# Patient Record
Sex: FEMALE | Race: WHITE | NOT HISPANIC OR LATINO | Employment: FULL TIME | URBAN - METROPOLITAN AREA
[De-identification: names, ages, dates, MRNs, and addresses within clinical notes are randomized per-mention and may not be internally consistent; named-entity substitution may affect disease eponyms.]

---

## 2017-07-07 ENCOUNTER — OFFICE VISIT (OUTPATIENT)
Dept: INTERNAL MEDICINE | Facility: CLINIC | Age: 62
End: 2017-07-07

## 2017-07-07 VITALS
HEART RATE: 74 BPM | HEIGHT: 63 IN | SYSTOLIC BLOOD PRESSURE: 104 MMHG | BODY MASS INDEX: 22.5 KG/M2 | WEIGHT: 127 LBS | DIASTOLIC BLOOD PRESSURE: 72 MMHG | OXYGEN SATURATION: 100 %

## 2017-07-07 DIAGNOSIS — R20.2 PARESTHESIA OF BOTH LOWER EXTREMITIES: ICD-10-CM

## 2017-07-07 DIAGNOSIS — R20.0 FACIAL NUMBNESS: Primary | ICD-10-CM

## 2017-07-07 LAB
ALBUMIN SERPL-MCNC: 4.2 G/DL (ref 3.2–4.8)
ALBUMIN/GLOB SERPL: 1.7 G/DL (ref 1.5–2.5)
ALP SERPL-CCNC: 88 U/L (ref 25–100)
ALT SERPL W P-5'-P-CCNC: 27 U/L (ref 7–40)
ANION GAP SERPL CALCULATED.3IONS-SCNC: 4 MMOL/L (ref 3–11)
AST SERPL-CCNC: 33 U/L (ref 0–33)
BILIRUB SERPL-MCNC: 0.2 MG/DL (ref 0.3–1.2)
BUN BLD-MCNC: 13 MG/DL (ref 9–23)
BUN/CREAT SERPL: 16.3 (ref 7–25)
CALCIUM SPEC-SCNC: 9.9 MG/DL (ref 8.7–10.4)
CHLORIDE SERPL-SCNC: 103 MMOL/L (ref 99–109)
CO2 SERPL-SCNC: 34 MMOL/L (ref 20–31)
CREAT BLD-MCNC: 0.8 MG/DL (ref 0.6–1.3)
DEPRECATED RDW RBC AUTO: 43.2 FL (ref 37–54)
ERYTHROCYTE [DISTWIDTH] IN BLOOD BY AUTOMATED COUNT: 12.8 % (ref 11.3–14.5)
GFR SERPL CREATININE-BSD FRML MDRD: 72 ML/MIN/1.73
GLOBULIN UR ELPH-MCNC: 2.5 GM/DL
GLUCOSE BLD-MCNC: 82 MG/DL (ref 70–100)
HCT VFR BLD AUTO: 39.9 % (ref 34.5–44)
HGB BLD-MCNC: 12.7 G/DL (ref 11.5–15.5)
MCH RBC QN AUTO: 29.6 PG (ref 27–31)
MCHC RBC AUTO-ENTMCNC: 31.8 G/DL (ref 32–36)
MCV RBC AUTO: 93 FL (ref 80–99)
PLATELET # BLD AUTO: 237 10*3/MM3 (ref 150–450)
PMV BLD AUTO: 10.7 FL (ref 6–12)
POTASSIUM BLD-SCNC: 4.1 MMOL/L (ref 3.5–5.5)
PROT SERPL-MCNC: 6.7 G/DL (ref 5.7–8.2)
RBC # BLD AUTO: 4.29 10*6/MM3 (ref 3.89–5.14)
SODIUM BLD-SCNC: 141 MMOL/L (ref 132–146)
TSH SERPL DL<=0.05 MIU/L-ACNC: 1.44 MIU/ML (ref 0.35–5.35)
VIT B12 BLD-MCNC: 1125 PG/ML (ref 211–911)
WBC NRBC COR # BLD: 7.31 10*3/MM3 (ref 3.5–10.8)

## 2017-07-07 PROCEDURE — 85027 COMPLETE CBC AUTOMATED: CPT | Performed by: PHYSICIAN ASSISTANT

## 2017-07-07 PROCEDURE — 82607 VITAMIN B-12: CPT | Performed by: PHYSICIAN ASSISTANT

## 2017-07-07 PROCEDURE — 80053 COMPREHEN METABOLIC PANEL: CPT | Performed by: PHYSICIAN ASSISTANT

## 2017-07-07 PROCEDURE — 84443 ASSAY THYROID STIM HORMONE: CPT | Performed by: PHYSICIAN ASSISTANT

## 2017-07-07 PROCEDURE — 99203 OFFICE O/P NEW LOW 30 MIN: CPT | Performed by: PHYSICIAN ASSISTANT

## 2017-07-07 RX ORDER — AMITRIPTYLINE HYDROCHLORIDE 25 MG/1
1 TABLET, FILM COATED ORAL NIGHTLY
Refills: 1 | COMMUNITY
Start: 2017-06-13

## 2017-07-07 NOTE — PROGRESS NOTES
Chief Complaint   Patient presents with   • Establish Care     new pt   • Numbness     bilateral, intermittent for about 3 weeks       Subjective   Maite Lopes is a 62 y.o. female.       History of Present Illness     Pt has previously seen Dr Bladimir Florence and he is closing his practice. Sees Dr Ortiz for rheumatology and Dr Hilliard for gyn care. Dr Nick has done colonoscopy in the past.    Has osteoarthritis with degenerative changes in her neck. Hears some grinding in her neck when she turns her head. No pain with it. Has some known bone spurs that cause pain with overuse and certain movements.    She notes that she has had intermittent numbness on both sides of her face for the past month. Has some previous intermittent numbness in her soft palate and upper gums that comes with worsening sinus issues. The first time she noticed it, was driving and had sensation of tingling that went up her bilateral cheeks- not pins and needles. Has not tried touching her face when happening.     In the last couple of months she has noticed a feeling like something is crawling on her bilateral legs and feet. Happens intermittently and is transient. No noticeable loss of strength in her legs, walks daily to get to work.    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 She does grind her teeth and clenches her jaw.      Current Outpatient Prescriptions:   •  amitriptyline (ELAVIL) 25 MG tablet, Take 1 tablet by mouth Daily., Disp: , Rfl: 1     [facility]  The following portions of the patient's history were reviewed and updated as appropriate: allergies, current medications, past family history, past medical history, past social history, past surgical history and problem list.    Review of Systems   Constitutional: Negative for appetite change, chills, fever and unexpected weight change.   HENT: Negative for ear discharge, sinus pressure and tinnitus.    Eyes: Negative for photophobia and  "pain.   Respiratory: Negative for chest tightness, shortness of breath and wheezing.    Cardiovascular: Negative for chest pain and palpitations.   Gastrointestinal: Negative for abdominal pain and blood in stool.   Endocrine: Negative for cold intolerance, heat intolerance, polydipsia and polyphagia.   Genitourinary: Negative.    Musculoskeletal: Negative for joint swelling.   Skin: Negative for color change and wound.   Allergic/Immunologic: Negative.    Neurological: Positive for numbness. Negative for dizziness, tremors, syncope, facial asymmetry, speech difficulty, weakness, light-headedness and headaches.   Hematological: Negative for adenopathy.   Psychiatric/Behavioral: Negative for confusion, hallucinations, sleep disturbance and suicidal ideas. The patient is nervous/anxious.        Objective   /72  Pulse 74  Ht 63\" (160 cm)  Wt 127 lb (57.6 kg)  SpO2 100%  BMI 22.5 kg/m2    Physical Exam   Constitutional: She appears well-developed and well-nourished.   HENT:   Head: Normocephalic.   Right Ear: Hearing, tympanic membrane, external ear and ear canal normal.   Left Ear: Hearing, tympanic membrane, external ear and ear canal normal.   Nose: Nose normal.   Mouth/Throat: Oropharynx is clear and moist.   Eyes: Conjunctivae are normal. Pupils are equal, round, and reactive to light.   Neck: Normal range of motion.   Cardiovascular: Normal rate, regular rhythm and normal heart sounds.    Pulmonary/Chest: Effort normal and breath sounds normal. She has no decreased breath sounds. She has no wheezes. She has no rhonchi. She has no rales.   Musculoskeletal: Normal range of motion.   Neurological: She is alert. She has normal strength. She displays normal reflexes. No cranial nerve deficit or sensory deficit.   Reflex Scores:       Brachioradialis reflexes are 2+ on the right side and 2+ on the left side.       Patellar reflexes are 2+ on the right side and 2+ on the left side.       Achilles reflexes are " 2+ on the right side and 2+ on the left side.  Skin: Skin is warm and dry.   Psychiatric: She has a normal mood and affect. Her behavior is normal.   Nursing note and vitals reviewed.      Results for orders placed or performed in visit on 07/07/17   CBC (No Diff)   Result Value Ref Range    WBC 7.31 3.50 - 10.80 10*3/mm3    RBC 4.29 3.89 - 5.14 10*6/mm3    Hemoglobin 12.7 11.5 - 15.5 g/dL    Hematocrit 39.9 34.5 - 44.0 %    MCV 93.0 80.0 - 99.0 fL    MCH 29.6 27.0 - 31.0 pg    MCHC 31.8 (L) 32.0 - 36.0 g/dL    RDW 12.8 11.3 - 14.5 %    RDW-SD 43.2 37.0 - 54.0 fl    MPV 10.7 6.0 - 12.0 fL    Platelets 237 150 - 450 10*3/mm3   TSH   Result Value Ref Range    TSH 1.436 0.350 - 5.350 mIU/mL   Vitamin B12   Result Value Ref Range    Vitamin B-12 1125 (H) 211 - 911 pg/mL   Comprehensive Metabolic Panel   Result Value Ref Range    Glucose 82 70 - 100 mg/dL    BUN 13 9 - 23 mg/dL    Creatinine 0.80 0.60 - 1.30 mg/dL    Sodium 141 132 - 146 mmol/L    Potassium 4.1 3.5 - 5.5 mmol/L    Chloride 103 99 - 109 mmol/L    CO2 34.0 (H) 20.0 - 31.0 mmol/L    Calcium 9.9 8.7 - 10.4 mg/dL    Total Protein 6.7 5.7 - 8.2 g/dL    Albumin 4.20 3.20 - 4.80 g/dL    ALT (SGPT) 27 7 - 40 U/L    AST (SGOT) 33 0 - 33 U/L    Alkaline Phosphatase 88 25 - 100 U/L    Total Bilirubin 0.2 (L) 0.3 - 1.2 mg/dL    eGFR Non African Amer 72 >60 mL/min/1.73    Globulin 2.5 gm/dL    A/G Ratio 1.7 1.5 - 2.5 g/dL    BUN/Creatinine Ratio 16.3 7.0 - 25.0    Anion Gap 4.0 3.0 - 11.0 mmol/L        ASSESSMENT/PLAN    Problem List Items Addressed This Visit     None      Visit Diagnoses     Facial numbness    -  Primary    Relevant Orders    MRI Brain With & Without Contrast    CBC (No Diff) (Completed)    TSH (Completed)    Vitamin B12 (Completed)    Comprehensive Metabolic Panel (Completed)    Paresthesia of both lower extremities        Relevant Orders    MRI Brain With & Without Contrast    CBC (No Diff) (Completed)    TSH (Completed)    Vitamin B12 (Completed)     Comprehensive Metabolic Panel (Completed)               Return for Annual physical.

## 2017-07-17 ENCOUNTER — APPOINTMENT (OUTPATIENT)
Dept: MRI IMAGING | Facility: HOSPITAL | Age: 62
End: 2017-07-17

## 2019-03-25 ENCOUNTER — APPOINTMENT (OUTPATIENT)
Dept: PREADMISSION TESTING | Facility: HOSPITAL | Age: 64
End: 2019-03-25

## 2019-03-25 VITALS — HEIGHT: 63 IN | BODY MASS INDEX: 20.39 KG/M2 | WEIGHT: 115.08 LBS

## 2019-03-25 LAB
ANION GAP SERPL CALCULATED.3IONS-SCNC: 9 MMOL/L (ref 3–11)
BUN BLD-MCNC: 16 MG/DL (ref 9–23)
BUN/CREAT SERPL: 19.8 (ref 7–25)
CALCIUM SPEC-SCNC: 9.6 MG/DL (ref 8.7–10.4)
CHLORIDE SERPL-SCNC: 102 MMOL/L (ref 99–109)
CO2 SERPL-SCNC: 30 MMOL/L (ref 20–31)
CREAT BLD-MCNC: 0.81 MG/DL (ref 0.6–1.3)
DEPRECATED RDW RBC AUTO: 44.3 FL (ref 37–54)
ERYTHROCYTE [DISTWIDTH] IN BLOOD BY AUTOMATED COUNT: 12.9 % (ref 11.3–14.5)
GFR SERPL CREATININE-BSD FRML MDRD: 71 ML/MIN/1.73
GLUCOSE BLD-MCNC: 93 MG/DL (ref 70–100)
HCT VFR BLD AUTO: 39.4 % (ref 34.5–44)
HGB BLD-MCNC: 12.6 G/DL (ref 11.5–15.5)
MCH RBC QN AUTO: 30 PG (ref 27–31)
MCHC RBC AUTO-ENTMCNC: 32 G/DL (ref 32–36)
MCV RBC AUTO: 93.8 FL (ref 80–99)
PLATELET # BLD AUTO: 232 10*3/MM3 (ref 150–450)
PMV BLD AUTO: 10.7 FL (ref 6–12)
POTASSIUM BLD-SCNC: 4 MMOL/L (ref 3.5–5.5)
RBC # BLD AUTO: 4.2 10*6/MM3 (ref 3.89–5.14)
SODIUM BLD-SCNC: 141 MMOL/L (ref 132–146)
WBC NRBC COR # BLD: 6.68 10*3/MM3 (ref 3.5–10.8)

## 2019-03-25 PROCEDURE — 36415 COLL VENOUS BLD VENIPUNCTURE: CPT

## 2019-03-25 PROCEDURE — 80048 BASIC METABOLIC PNL TOTAL CA: CPT | Performed by: OBSTETRICS & GYNECOLOGY

## 2019-03-25 PROCEDURE — 85027 COMPLETE CBC AUTOMATED: CPT | Performed by: OBSTETRICS & GYNECOLOGY

## 2019-04-02 ENCOUNTER — ANESTHESIA EVENT (OUTPATIENT)
Dept: PERIOP | Facility: HOSPITAL | Age: 64
End: 2019-04-02

## 2019-04-02 RX ORDER — SODIUM CHLORIDE 0.9 % (FLUSH) 0.9 %
3 SYRINGE (ML) INJECTION EVERY 12 HOURS SCHEDULED
Status: CANCELLED | OUTPATIENT
Start: 2019-04-02

## 2019-04-02 RX ORDER — FAMOTIDINE 10 MG/ML
20 INJECTION, SOLUTION INTRAVENOUS ONCE
Status: CANCELLED | OUTPATIENT
Start: 2019-04-02 | End: 2019-04-02

## 2019-04-02 RX ORDER — SODIUM CHLORIDE 0.9 % (FLUSH) 0.9 %
3-10 SYRINGE (ML) INJECTION AS NEEDED
Status: CANCELLED | OUTPATIENT
Start: 2019-04-02

## 2019-04-03 ENCOUNTER — ANESTHESIA (OUTPATIENT)
Dept: PERIOP | Facility: HOSPITAL | Age: 64
End: 2019-04-03

## 2019-04-03 ENCOUNTER — HOSPITAL ENCOUNTER (OUTPATIENT)
Facility: HOSPITAL | Age: 64
Discharge: HOME | End: 2019-04-04
Attending: OBSTETRICS & GYNECOLOGY | Admitting: OBSTETRICS & GYNECOLOGY

## 2019-04-03 DIAGNOSIS — N81.4 UTERINE PROLAPSE: ICD-10-CM

## 2019-04-03 PROCEDURE — 25010000002 GENTAMICIN PER 80 MG: Performed by: OBSTETRICS & GYNECOLOGY

## 2019-04-03 PROCEDURE — 25010000002 DEXAMETHASONE PER 1 MG: Performed by: NURSE ANESTHETIST, CERTIFIED REGISTERED

## 2019-04-03 PROCEDURE — 25010000002 ONDANSETRON PER 1 MG: Performed by: NURSE ANESTHETIST, CERTIFIED REGISTERED

## 2019-04-03 PROCEDURE — 25010000002 FENTANYL CITRATE (PF) 100 MCG/2ML SOLUTION: Performed by: NURSE ANESTHETIST, CERTIFIED REGISTERED

## 2019-04-03 PROCEDURE — 25010000002 PROPOFOL 1000 MG/ML EMULSION: Performed by: NURSE ANESTHETIST, CERTIFIED REGISTERED

## 2019-04-03 PROCEDURE — 25010000002 PHENYLEPHRINE PER 1 ML: Performed by: NURSE ANESTHETIST, CERTIFIED REGISTERED

## 2019-04-03 PROCEDURE — 25010000002 PROPOFOL 10 MG/ML EMULSION: Performed by: NURSE ANESTHETIST, CERTIFIED REGISTERED

## 2019-04-03 PROCEDURE — 25010000002 KETOROLAC TROMETHAMINE PER 15 MG: Performed by: NURSE ANESTHETIST, CERTIFIED REGISTERED

## 2019-04-03 PROCEDURE — 25010000002 NEOSTIGMINE 10 MG/10ML SOLUTION: Performed by: NURSE ANESTHETIST, CERTIFIED REGISTERED

## 2019-04-03 PROCEDURE — 88305 TISSUE EXAM BY PATHOLOGIST: CPT | Performed by: OBSTETRICS & GYNECOLOGY

## 2019-04-03 RX ORDER — MORPHINE SULFATE 2 MG/ML
1 INJECTION, SOLUTION INTRAMUSCULAR; INTRAVENOUS EVERY 4 HOURS PRN
Status: DISCONTINUED | OUTPATIENT
Start: 2019-04-03 | End: 2019-04-04 | Stop reason: HOSPADM

## 2019-04-03 RX ORDER — SODIUM CHLORIDE 9 MG/ML
100 INJECTION, SOLUTION INTRAVENOUS CONTINUOUS
Status: DISCONTINUED | OUTPATIENT
Start: 2019-04-03 | End: 2019-04-04 | Stop reason: HOSPADM

## 2019-04-03 RX ORDER — GABAPENTIN 100 MG/1
100 CAPSULE ORAL 3 TIMES DAILY
Status: ON HOLD | COMMUNITY
End: 2019-04-03

## 2019-04-03 RX ORDER — AMITRIPTYLINE HYDROCHLORIDE 25 MG/1
25 TABLET, FILM COATED ORAL NIGHTLY
Status: DISCONTINUED | OUTPATIENT
Start: 2019-04-03 | End: 2019-04-04 | Stop reason: HOSPADM

## 2019-04-03 RX ORDER — FAMOTIDINE 20 MG/1
20 TABLET, FILM COATED ORAL ONCE
Status: COMPLETED | OUTPATIENT
Start: 2019-04-03 | End: 2019-04-03

## 2019-04-03 RX ORDER — SIMETHICONE 80 MG
80 TABLET,CHEWABLE ORAL 4 TIMES DAILY PRN
Status: DISCONTINUED | OUTPATIENT
Start: 2019-04-03 | End: 2019-04-04 | Stop reason: HOSPADM

## 2019-04-03 RX ORDER — NEOSTIGMINE METHYLSULFATE 1 MG/ML
INJECTION, SOLUTION INTRAVENOUS AS NEEDED
Status: DISCONTINUED | OUTPATIENT
Start: 2019-04-03 | End: 2019-04-03 | Stop reason: SURG

## 2019-04-03 RX ORDER — FENTANYL CITRATE 50 UG/ML
50 INJECTION, SOLUTION INTRAMUSCULAR; INTRAVENOUS
Status: DISCONTINUED | OUTPATIENT
Start: 2019-04-03 | End: 2019-04-03 | Stop reason: HOSPADM

## 2019-04-03 RX ORDER — ONDANSETRON 2 MG/ML
4 INJECTION INTRAMUSCULAR; INTRAVENOUS EVERY 6 HOURS PRN
Status: DISCONTINUED | OUTPATIENT
Start: 2019-04-03 | End: 2019-04-04 | Stop reason: HOSPADM

## 2019-04-03 RX ORDER — LIDOCAINE HYDROCHLORIDE 10 MG/ML
0.5 INJECTION, SOLUTION EPIDURAL; INFILTRATION; INTRACAUDAL; PERINEURAL ONCE AS NEEDED
Status: COMPLETED | OUTPATIENT
Start: 2019-04-03 | End: 2019-04-03

## 2019-04-03 RX ORDER — HEPARIN SODIUM 5000 [USP'U]/ML
5000 INJECTION, SOLUTION INTRAVENOUS; SUBCUTANEOUS EVERY 12 HOURS SCHEDULED
Status: DISCONTINUED | OUTPATIENT
Start: 2019-04-04 | End: 2019-04-04 | Stop reason: HOSPADM

## 2019-04-03 RX ORDER — ONDANSETRON 2 MG/ML
INJECTION INTRAMUSCULAR; INTRAVENOUS AS NEEDED
Status: DISCONTINUED | OUTPATIENT
Start: 2019-04-03 | End: 2019-04-03 | Stop reason: SURG

## 2019-04-03 RX ORDER — ONDANSETRON 2 MG/ML
4 INJECTION INTRAMUSCULAR; INTRAVENOUS ONCE AS NEEDED
Status: DISCONTINUED | OUTPATIENT
Start: 2019-04-03 | End: 2019-04-03 | Stop reason: HOSPADM

## 2019-04-03 RX ORDER — CLINDAMYCIN PHOSPHATE 900 MG/50ML
900 INJECTION INTRAVENOUS EVERY 8 HOURS
Status: COMPLETED | OUTPATIENT
Start: 2019-04-03 | End: 2019-04-04

## 2019-04-03 RX ORDER — KETOROLAC TROMETHAMINE 30 MG/ML
INJECTION, SOLUTION INTRAMUSCULAR; INTRAVENOUS AS NEEDED
Status: DISCONTINUED | OUTPATIENT
Start: 2019-04-03 | End: 2019-04-03 | Stop reason: SURG

## 2019-04-03 RX ORDER — FENTANYL CITRATE 50 UG/ML
INJECTION, SOLUTION INTRAMUSCULAR; INTRAVENOUS AS NEEDED
Status: DISCONTINUED | OUTPATIENT
Start: 2019-04-03 | End: 2019-04-03 | Stop reason: SURG

## 2019-04-03 RX ORDER — HYDROMORPHONE HYDROCHLORIDE 1 MG/ML
0.5 INJECTION, SOLUTION INTRAMUSCULAR; INTRAVENOUS; SUBCUTANEOUS
Status: DISCONTINUED | OUTPATIENT
Start: 2019-04-03 | End: 2019-04-03 | Stop reason: HOSPADM

## 2019-04-03 RX ORDER — ONDANSETRON 4 MG/1
4 TABLET, FILM COATED ORAL EVERY 6 HOURS PRN
Status: DISCONTINUED | OUTPATIENT
Start: 2019-04-03 | End: 2019-04-04 | Stop reason: HOSPADM

## 2019-04-03 RX ORDER — PROPOFOL 10 MG/ML
VIAL (ML) INTRAVENOUS AS NEEDED
Status: DISCONTINUED | OUTPATIENT
Start: 2019-04-03 | End: 2019-04-03 | Stop reason: SURG

## 2019-04-03 RX ORDER — LIDOCAINE HYDROCHLORIDE 10 MG/ML
INJECTION, SOLUTION EPIDURAL; INFILTRATION; INTRACAUDAL; PERINEURAL AS NEEDED
Status: DISCONTINUED | OUTPATIENT
Start: 2019-04-03 | End: 2019-04-03 | Stop reason: SURG

## 2019-04-03 RX ORDER — DEXAMETHASONE SODIUM PHOSPHATE 4 MG/ML
INJECTION, SOLUTION INTRA-ARTICULAR; INTRALESIONAL; INTRAMUSCULAR; INTRAVENOUS; SOFT TISSUE AS NEEDED
Status: DISCONTINUED | OUTPATIENT
Start: 2019-04-03 | End: 2019-04-03 | Stop reason: SURG

## 2019-04-03 RX ORDER — SODIUM CHLORIDE 9 MG/ML
INJECTION, SOLUTION INTRAVENOUS AS NEEDED
Status: DISCONTINUED | OUTPATIENT
Start: 2019-04-03 | End: 2019-04-03 | Stop reason: HOSPADM

## 2019-04-03 RX ORDER — IBUPROFEN 400 MG/1
400 TABLET ORAL EVERY 6 HOURS PRN
Status: DISCONTINUED | OUTPATIENT
Start: 2019-04-03 | End: 2019-04-04 | Stop reason: HOSPADM

## 2019-04-03 RX ORDER — SODIUM CHLORIDE, SODIUM LACTATE, POTASSIUM CHLORIDE, CALCIUM CHLORIDE 600; 310; 30; 20 MG/100ML; MG/100ML; MG/100ML; MG/100ML
9 INJECTION, SOLUTION INTRAVENOUS CONTINUOUS
Status: DISCONTINUED | OUTPATIENT
Start: 2019-04-03 | End: 2019-04-04 | Stop reason: HOSPADM

## 2019-04-03 RX ORDER — MAGNESIUM HYDROXIDE 1200 MG/15ML
LIQUID ORAL AS NEEDED
Status: DISCONTINUED | OUTPATIENT
Start: 2019-04-03 | End: 2019-04-03 | Stop reason: HOSPADM

## 2019-04-03 RX ORDER — HYDROCODONE BITARTRATE AND ACETAMINOPHEN 7.5; 325 MG/1; MG/1
1 TABLET ORAL EVERY 4 HOURS PRN
Status: DISCONTINUED | OUTPATIENT
Start: 2019-04-03 | End: 2019-04-04 | Stop reason: HOSPADM

## 2019-04-03 RX ORDER — GLYCOPYRROLATE 0.2 MG/ML
INJECTION INTRAMUSCULAR; INTRAVENOUS AS NEEDED
Status: DISCONTINUED | OUTPATIENT
Start: 2019-04-03 | End: 2019-04-03 | Stop reason: SURG

## 2019-04-03 RX ORDER — BACLOFEN 10 MG/1
10 TABLET ORAL 3 TIMES DAILY
COMMUNITY

## 2019-04-03 RX ORDER — ATRACURIUM BESYLATE 10 MG/ML
INJECTION, SOLUTION INTRAVENOUS AS NEEDED
Status: DISCONTINUED | OUTPATIENT
Start: 2019-04-03 | End: 2019-04-03 | Stop reason: SURG

## 2019-04-03 RX ORDER — HEPARIN SODIUM 5000 [USP'U]/ML
5000 INJECTION, SOLUTION INTRAVENOUS; SUBCUTANEOUS ONCE
Status: DISCONTINUED | OUTPATIENT
Start: 2019-04-03 | End: 2019-04-03 | Stop reason: HOSPADM

## 2019-04-03 RX ORDER — NALOXONE HCL 0.4 MG/ML
0.4 VIAL (ML) INJECTION
Status: DISCONTINUED | OUTPATIENT
Start: 2019-04-03 | End: 2019-04-04 | Stop reason: HOSPADM

## 2019-04-03 RX ORDER — CLINDAMYCIN PHOSPHATE 900 MG/50ML
900 INJECTION INTRAVENOUS ONCE
Status: COMPLETED | OUTPATIENT
Start: 2019-04-03 | End: 2019-04-03

## 2019-04-03 RX ORDER — BUPIVACAINE HYDROCHLORIDE 5 MG/ML
INJECTION, SOLUTION PERINEURAL AS NEEDED
Status: DISCONTINUED | OUTPATIENT
Start: 2019-04-03 | End: 2019-04-03 | Stop reason: HOSPADM

## 2019-04-03 RX ADMIN — CLINDAMYCIN PHOSPHATE 900 MG: 900 INJECTION, SOLUTION INTRAVENOUS at 18:13

## 2019-04-03 RX ADMIN — LIDOCAINE HYDROCHLORIDE 50 MG: 10 INJECTION, SOLUTION EPIDURAL; INFILTRATION; INTRACAUDAL; PERINEURAL at 09:54

## 2019-04-03 RX ADMIN — PHENYLEPHRINE HYDROCHLORIDE 80 MCG: 10 INJECTION INTRAVENOUS at 10:18

## 2019-04-03 RX ADMIN — DEXAMETHASONE SODIUM PHOSPHATE 8 MG: 4 INJECTION, SOLUTION INTRAMUSCULAR; INTRAVENOUS at 10:04

## 2019-04-03 RX ADMIN — FAMOTIDINE 20 MG: 20 TABLET ORAL at 08:15

## 2019-04-03 RX ADMIN — SODIUM CHLORIDE 100 ML/HR: 9 INJECTION, SOLUTION INTRAVENOUS at 15:16

## 2019-04-03 RX ADMIN — AZTREONAM 2 G: 2 INJECTION, POWDER, LYOPHILIZED, FOR SOLUTION INTRAMUSCULAR; INTRAVENOUS at 23:54

## 2019-04-03 RX ADMIN — PHENYLEPHRINE HYDROCHLORIDE 80 MCG: 10 INJECTION INTRAVENOUS at 10:09

## 2019-04-03 RX ADMIN — ONDANSETRON 4 MG: 2 INJECTION INTRAMUSCULAR; INTRAVENOUS at 11:30

## 2019-04-03 RX ADMIN — ATRACURIUM BESYLATE 40 MG: 10 INJECTION, SOLUTION INTRAVENOUS at 09:55

## 2019-04-03 RX ADMIN — AMITRIPTYLINE HYDROCHLORIDE 25 MG: 25 TABLET, FILM COATED ORAL at 21:41

## 2019-04-03 RX ADMIN — PHENYLEPHRINE HYDROCHLORIDE 80 MCG: 10 INJECTION INTRAVENOUS at 10:27

## 2019-04-03 RX ADMIN — PROPOFOL 25 MCG/KG/MIN: 10 INJECTION, EMULSION INTRAVENOUS at 10:05

## 2019-04-03 RX ADMIN — KETOROLAC TROMETHAMINE 30 MG: 30 INJECTION, SOLUTION INTRAMUSCULAR at 11:30

## 2019-04-03 RX ADMIN — PHENYLEPHRINE HYDROCHLORIDE 80 MCG: 10 INJECTION INTRAVENOUS at 10:12

## 2019-04-03 RX ADMIN — EPHEDRINE SULFATE 10 MG: 50 INJECTION INTRAMUSCULAR; INTRAVENOUS; SUBCUTANEOUS at 10:25

## 2019-04-03 RX ADMIN — AZTREONAM 2 G: 2 INJECTION, POWDER, LYOPHILIZED, FOR SOLUTION INTRAMUSCULAR; INTRAVENOUS at 17:26

## 2019-04-03 RX ADMIN — FENTANYL CITRATE 50 MCG: 50 INJECTION, SOLUTION INTRAMUSCULAR; INTRAVENOUS at 09:54

## 2019-04-03 RX ADMIN — GLYCOPYRROLATE 0.2 MG: 0.2 INJECTION, SOLUTION INTRAMUSCULAR; INTRAVENOUS at 11:45

## 2019-04-03 RX ADMIN — FENTANYL CITRATE 50 MCG: 50 INJECTION, SOLUTION INTRAMUSCULAR; INTRAVENOUS at 11:46

## 2019-04-03 RX ADMIN — EPHEDRINE SULFATE 5 MG: 50 INJECTION INTRAMUSCULAR; INTRAVENOUS; SUBCUTANEOUS at 10:54

## 2019-04-03 RX ADMIN — SODIUM CHLORIDE, POTASSIUM CHLORIDE, SODIUM LACTATE AND CALCIUM CHLORIDE: 600; 310; 30; 20 INJECTION, SOLUTION INTRAVENOUS at 11:31

## 2019-04-03 RX ADMIN — EPHEDRINE SULFATE 10 MG: 50 INJECTION INTRAMUSCULAR; INTRAVENOUS; SUBCUTANEOUS at 10:27

## 2019-04-03 RX ADMIN — PROPOFOL 150 MG: 10 INJECTION, EMULSION INTRAVENOUS at 09:54

## 2019-04-03 RX ADMIN — GENTAMICIN SULFATE 260 MG: 40 INJECTION, SOLUTION INTRAMUSCULAR; INTRAVENOUS at 10:03

## 2019-04-03 RX ADMIN — LIDOCAINE HYDROCHLORIDE 0.2 ML: 10 INJECTION, SOLUTION EPIDURAL; INFILTRATION; INTRACAUDAL; PERINEURAL at 08:00

## 2019-04-03 RX ADMIN — NEOSTIGMINE METHYLSULFATE 2 MG: 1 INJECTION, SOLUTION INTRAVENOUS at 11:45

## 2019-04-03 RX ADMIN — EPHEDRINE SULFATE 10 MG: 50 INJECTION INTRAMUSCULAR; INTRAVENOUS; SUBCUTANEOUS at 11:00

## 2019-04-03 RX ADMIN — CLINDAMYCIN PHOSPHATE 900 MG: 18 INJECTION, SOLUTION INTRAVENOUS at 09:54

## 2019-04-03 RX ADMIN — SODIUM CHLORIDE, POTASSIUM CHLORIDE, SODIUM LACTATE AND CALCIUM CHLORIDE 9 ML/HR: 600; 310; 30; 20 INJECTION, SOLUTION INTRAVENOUS at 08:00

## 2019-04-03 NOTE — ANESTHESIA PREPROCEDURE EVALUATION
Anesthesia Evaluation     Patient summary reviewed and Nursing notes reviewed                Airway   Mallampati: II  Dental      Pulmonary - negative pulmonary ROS   Cardiovascular - negative cardio ROS        Neuro/Psych- negative ROS  GI/Hepatic/Renal/Endo - negative ROS     Musculoskeletal (-) negative ROS    Abdominal    Substance History - negative use     OB/GYN negative ob/gyn ROS         Other                        Anesthesia Plan    ASA 3     general     intravenous induction   Anesthetic plan, all risks, benefits, and alternatives have been provided, discussed and informed consent has been obtained with: patient.    Plan discussed with CRNA.

## 2019-04-03 NOTE — ANESTHESIA POSTPROCEDURE EVALUATION
Patient: Maite Lopes    Procedure Summary     Date:  04/03/19 Room / Location:   [facility] / [facility]    Anesthesia Start:  0951 Anesthesia Stop:  1210    Procedure:  LAPAROSCOPIC ASSISTED VAGINAL HYSTERECTOMY WITH BILATERAL SALPINGO-OOPHORECTOMY, VAGINAL VAULT SUSPENSION, ANTERIOR VAGINAL REPAIR (N/A Abdomen) Diagnosis:      Surgeon:  Nuun Hilliard MD Provider:  Scott Hayes MD    Anesthesia Type:  general ASA Status:  3          Anesthesia Type: general  Last vitals  BP   115/65 (04/03/19 1210)   Temp   97.5 °F (36.4 °C) (04/03/19 1210)   Pulse   64 (04/03/19 1210)   Resp   12 (04/03/19 1210)     SpO2   97 % (04/03/19 1210)     Post Anesthesia Care and Evaluation    Patient location during evaluation: PACU  Patient participation: complete - patient cannot participate  Level of consciousness: responsive to physical stimuli  Pain score: 0  Pain management: adequate  Airway patency: patent  Anesthetic complications: No anesthetic complications  PONV Status: none  Cardiovascular status: acceptable  Respiratory status: acceptable  Hydration status: acceptable

## 2019-04-03 NOTE — ANESTHESIA PROCEDURE NOTES
Airway  Urgency: elective    Airway not difficult    General Information and Staff    Patient location during procedure: OR  CRNA: Elzbieta Ching CRNA    Indications and Patient Condition  Indications for airway management: airway protection    Preoxygenated: yes  MILS not maintained throughout  Mask difficulty assessment: 1 - vent by mask    Final Airway Details  Final airway type: endotracheal airway      Successful airway: ETT  Cuffed: yes   Successful intubation technique: video laryngoscopy  Endotracheal tube insertion site: oral  Blade size: 3  ETT size (mm): 7.0  Cormack-Lehane Classification: grade I - full view of glottis  Placement verified by: chest auscultation and capnometry   Measured from: lips  ETT to lips (cm): 20  Number of attempts at approach: 1    Additional Comments  Negative epigastric sounds, Breath sound equal bilaterally with symmetric chest rise and fall    Limited neck ROM. Pt states she wore neck brace for several weeks after last surgery d/t pain. Glidescope 3 used without neck extension, neutral neck position maintained throughout induction and intubation.

## 2019-04-04 VITALS
BODY MASS INDEX: 20.39 KG/M2 | RESPIRATION RATE: 18 BRPM | TEMPERATURE: 97.9 F | OXYGEN SATURATION: 97 % | SYSTOLIC BLOOD PRESSURE: 136 MMHG | HEART RATE: 80 BPM | WEIGHT: 115.08 LBS | DIASTOLIC BLOOD PRESSURE: 69 MMHG | HEIGHT: 63 IN

## 2019-04-04 PROBLEM — N81.4 UTERINE PROLAPSE: Status: RESOLVED | Noted: 2019-04-03 | Resolved: 2019-04-04

## 2019-04-04 LAB
ANION GAP SERPL CALCULATED.3IONS-SCNC: 6 MMOL/L (ref 3–11)
BUN BLD-MCNC: 13 MG/DL (ref 9–23)
BUN/CREAT SERPL: 18.6 (ref 7–25)
CALCIUM SPEC-SCNC: 8.7 MG/DL (ref 8.7–10.4)
CHLORIDE SERPL-SCNC: 110 MMOL/L (ref 99–109)
CO2 SERPL-SCNC: 25 MMOL/L (ref 20–31)
CREAT BLD-MCNC: 0.7 MG/DL (ref 0.6–1.3)
DEPRECATED RDW RBC AUTO: 44.4 FL (ref 37–54)
ERYTHROCYTE [DISTWIDTH] IN BLOOD BY AUTOMATED COUNT: 13.2 % (ref 11.3–14.5)
GFR SERPL CREATININE-BSD FRML MDRD: 84 ML/MIN/1.73
GLUCOSE BLD-MCNC: 111 MG/DL (ref 70–100)
HCT VFR BLD AUTO: 34.5 % (ref 34.5–44)
HGB BLD-MCNC: 11.6 G/DL (ref 11.5–15.5)
MCH RBC QN AUTO: 30.9 PG (ref 27–31)
MCHC RBC AUTO-ENTMCNC: 33.6 G/DL (ref 32–36)
MCV RBC AUTO: 92 FL (ref 80–99)
PLATELET # BLD AUTO: 225 10*3/MM3 (ref 150–450)
PMV BLD AUTO: 10.7 FL (ref 6–12)
POTASSIUM BLD-SCNC: 4.6 MMOL/L (ref 3.5–5.5)
RBC # BLD AUTO: 3.75 10*6/MM3 (ref 3.89–5.14)
SODIUM BLD-SCNC: 141 MMOL/L (ref 132–146)
WBC NRBC COR # BLD: 11.48 10*3/MM3 (ref 3.5–10.8)

## 2019-04-04 PROCEDURE — 85027 COMPLETE CBC AUTOMATED: CPT | Performed by: OBSTETRICS & GYNECOLOGY

## 2019-04-04 PROCEDURE — 25010000002 HEPARIN (PORCINE) PER 1000 UNITS: Performed by: OBSTETRICS & GYNECOLOGY

## 2019-04-04 PROCEDURE — 80048 BASIC METABOLIC PNL TOTAL CA: CPT | Performed by: OBSTETRICS & GYNECOLOGY

## 2019-04-04 RX ORDER — HYDROCODONE BITARTRATE AND ACETAMINOPHEN 7.5; 325 MG/1; MG/1
1 TABLET ORAL EVERY 4 HOURS PRN
Qty: 10 TABLET | Refills: 0 | Status: SHIPPED | OUTPATIENT
Start: 2019-04-04 | End: 2019-04-13

## 2019-04-04 RX ADMIN — SODIUM CHLORIDE 100 ML/HR: 9 INJECTION, SOLUTION INTRAVENOUS at 01:37

## 2019-04-04 RX ADMIN — HEPARIN SODIUM 5000 UNITS: 5000 INJECTION INTRAVENOUS; SUBCUTANEOUS at 09:33

## 2019-04-04 RX ADMIN — CLINDAMYCIN PHOSPHATE 900 MG: 900 INJECTION, SOLUTION INTRAVENOUS at 01:37

## 2019-04-08 LAB
CYTO UR: NORMAL
LAB AP CASE REPORT: NORMAL
LAB AP CLINICAL INFORMATION: NORMAL
PATH REPORT.FINAL DX SPEC: NORMAL
PATH REPORT.GROSS SPEC: NORMAL

## 2019-04-23 ENCOUNTER — TELEPHONE (OUTPATIENT)
Dept: GASTROENTEROLOGY | Facility: CLINIC | Age: 64
End: 2019-04-23

## 2020-06-24 RX ORDER — SODIUM, POTASSIUM,MAG SULFATES 17.5-3.13G
2 SOLUTION, RECONSTITUTED, ORAL ORAL TAKE AS DIRECTED
Qty: 354 ML | Refills: 0 | Status: SHIPPED | OUTPATIENT
Start: 2020-06-24

## 2020-07-03 ENCOUNTER — APPOINTMENT (OUTPATIENT)
Dept: PREADMISSION TESTING | Facility: HOSPITAL | Age: 65
End: 2020-07-03

## 2020-07-03 LAB
REF LAB TEST METHOD: NORMAL
SARS-COV-2 RNA RESP QL NAA+PROBE: NOT DETECTED

## 2020-07-03 PROCEDURE — U0004 COV-19 TEST NON-CDC HGH THRU: HCPCS

## 2020-07-03 PROCEDURE — C9803 HOPD COVID-19 SPEC COLLECT: HCPCS

## 2020-07-03 PROCEDURE — U0002 COVID-19 LAB TEST NON-CDC: HCPCS

## 2020-07-06 ENCOUNTER — OUTSIDE FACILITY SERVICE (OUTPATIENT)
Dept: GASTROENTEROLOGY | Facility: CLINIC | Age: 65
End: 2020-07-06

## 2020-07-06 PROCEDURE — 45385 COLONOSCOPY W/LESION REMOVAL: CPT | Performed by: INTERNAL MEDICINE

## 2024-05-30 ENCOUNTER — TELEPHONE (OUTPATIENT)
Age: 69
End: 2024-05-30
Payer: MEDICARE

## 2024-05-30 DIAGNOSIS — M81.0 OSTEOPOROSIS, UNSPECIFIED OSTEOPOROSIS TYPE, UNSPECIFIED PATHOLOGICAL FRACTURE PRESENCE: Primary | ICD-10-CM

## 2024-05-30 NOTE — TELEPHONE ENCOUNTER
PT IS NEEDING BONE DENSITY SCAN AT ANOTHER FACILITY. PLEASE OBTAIN ORDER FROM PROVIDER THEN FORWARD TO REFERRAL POOL.    -ZC

## 2024-06-06 ENCOUNTER — TRANSCRIBE ORDERS (OUTPATIENT)
Dept: ADMINISTRATIVE | Facility: HOSPITAL | Age: 69
End: 2024-06-06
Payer: MEDICARE

## 2024-06-06 DIAGNOSIS — Z12.31 VISIT FOR SCREENING MAMMOGRAM: Primary | ICD-10-CM

## 2024-06-13 ENCOUNTER — HOSPITAL ENCOUNTER (OUTPATIENT)
Dept: BONE DENSITY | Facility: HOSPITAL | Age: 69
Discharge: HOME | End: 2024-06-13
Admitting: NURSE PRACTITIONER
Payer: MEDICARE

## 2024-06-13 DIAGNOSIS — M81.0 OSTEOPOROSIS, UNSPECIFIED OSTEOPOROSIS TYPE, UNSPECIFIED PATHOLOGICAL FRACTURE PRESENCE: ICD-10-CM

## 2024-06-13 PROCEDURE — 77080 DXA BONE DENSITY AXIAL: CPT

## 2024-06-20 LAB
NCCN CRITERIA FLAG: NORMAL
TYRER CUZICK SCORE: 3.9

## 2024-06-30 PROBLEM — M54.50 LUMBAR SPINE PAIN: Status: ACTIVE | Noted: 2024-06-30

## 2024-06-30 PROBLEM — M15.0 PRIMARY OSTEOARTHRITIS INVOLVING MULTIPLE JOINTS: Status: ACTIVE | Noted: 2024-06-30

## 2024-06-30 PROBLEM — E55.9 VITAMIN D DEFICIENCY: Status: ACTIVE | Noted: 2024-06-30

## 2024-06-30 PROBLEM — M79.7 FIBROMYALGIA: Status: ACTIVE | Noted: 2024-06-30

## 2024-06-30 PROBLEM — M54.2 CERVICAL SPINE PAIN: Status: ACTIVE | Noted: 2024-06-30

## 2024-06-30 PROBLEM — M15.9 PRIMARY OSTEOARTHRITIS INVOLVING MULTIPLE JOINTS: Status: ACTIVE | Noted: 2024-06-30

## 2024-06-30 PROBLEM — R53.83 OTHER FATIGUE: Status: ACTIVE | Noted: 2024-06-30

## 2024-06-30 PROBLEM — M85.89 OSTEOPENIA OF MULTIPLE SITES: Status: ACTIVE | Noted: 2024-06-30

## 2024-07-01 ENCOUNTER — LAB (OUTPATIENT)
Facility: HOSPITAL | Age: 69
End: 2024-07-01
Payer: MEDICARE

## 2024-07-01 ENCOUNTER — OFFICE VISIT (OUTPATIENT)
Age: 69
End: 2024-07-01
Payer: MEDICARE

## 2024-07-01 VITALS
HEIGHT: 63 IN | BODY MASS INDEX: 19.24 KG/M2 | DIASTOLIC BLOOD PRESSURE: 62 MMHG | TEMPERATURE: 97.3 F | WEIGHT: 108.6 LBS | HEART RATE: 73 BPM | SYSTOLIC BLOOD PRESSURE: 110 MMHG

## 2024-07-01 DIAGNOSIS — M54.2 CERVICAL SPINE PAIN: ICD-10-CM

## 2024-07-01 DIAGNOSIS — M54.50 LUMBAR SPINE PAIN: ICD-10-CM

## 2024-07-01 DIAGNOSIS — M79.7 FIBROMYALGIA: ICD-10-CM

## 2024-07-01 DIAGNOSIS — M15.9 PRIMARY OSTEOARTHRITIS INVOLVING MULTIPLE JOINTS: Primary | ICD-10-CM

## 2024-07-01 DIAGNOSIS — M81.0 AGE-RELATED OSTEOPOROSIS WITHOUT CURRENT PATHOLOGICAL FRACTURE: ICD-10-CM

## 2024-07-01 DIAGNOSIS — E55.9 VITAMIN D DEFICIENCY: ICD-10-CM

## 2024-07-01 DIAGNOSIS — R53.83 OTHER FATIGUE: ICD-10-CM

## 2024-07-01 DIAGNOSIS — G47.8 POOR SLEEP PATTERN: Chronic | ICD-10-CM

## 2024-07-01 PROCEDURE — G2211 COMPLEX E/M VISIT ADD ON: HCPCS | Performed by: NURSE PRACTITIONER

## 2024-07-01 PROCEDURE — 82306 VITAMIN D 25 HYDROXY: CPT

## 2024-07-01 PROCEDURE — 99214 OFFICE O/P EST MOD 30 MIN: CPT | Performed by: NURSE PRACTITIONER

## 2024-07-01 PROCEDURE — 36415 COLL VENOUS BLD VENIPUNCTURE: CPT

## 2024-07-01 RX ORDER — ESTRADIOL 0.1 MG/G
CREAM VAGINAL
COMMUNITY
Start: 2024-05-28

## 2024-07-01 RX ORDER — LANOLIN ALCOHOL/MO/W.PET/CERES
CREAM (GRAM) TOPICAL
COMMUNITY

## 2024-07-01 RX ORDER — TRAZODONE HYDROCHLORIDE 50 MG/1
1 TABLET ORAL NIGHTLY
COMMUNITY
Start: 2024-02-01 | End: 2024-07-01 | Stop reason: SDUPTHER

## 2024-07-01 RX ORDER — IPRATROPIUM BROMIDE 42 UG/1
SPRAY, METERED NASAL
COMMUNITY
Start: 2024-06-12

## 2024-07-01 RX ORDER — HEPATITIS A VACCINE 1440 [IU]/ML
INJECTION, SUSPENSION INTRAMUSCULAR
COMMUNITY

## 2024-07-01 RX ORDER — TRAZODONE HYDROCHLORIDE 50 MG/1
TABLET ORAL
Qty: 135 TABLET | Refills: 1 | Status: SHIPPED | OUTPATIENT
Start: 2024-07-01

## 2024-07-01 RX ORDER — EPINEPHRINE 0.3 MG/.3ML
INJECTION SUBCUTANEOUS
COMMUNITY

## 2024-07-01 RX ORDER — DENOSUMAB 60 MG/ML
INJECTION SUBCUTANEOUS
COMMUNITY

## 2024-07-01 RX ORDER — BACLOFEN 10 MG/1
10 TABLET ORAL 3 TIMES DAILY
Qty: 270 TABLET | Refills: 1 | Status: SHIPPED | OUTPATIENT
Start: 2024-07-01

## 2024-07-01 RX ORDER — ALUMINUM ZIRCONIUM OCTACHLOROHYDREX GLY 16 G/100G
1 GEL TOPICAL DAILY
COMMUNITY

## 2024-07-01 NOTE — ASSESSMENT & PLAN NOTE
No longer sees Dr. Shelton.  This is a chronic problem    She has occasional numbness of left hand.

## 2024-07-01 NOTE — ASSESSMENT & PLAN NOTE
Still with fatigue and diffuse pain.   Overall better since residential. Still with lots of stress associates with  her 's dementia.   Pt global is 3/10 and VAS is 3/10.  Elavil helps sleep. Baclofen helps sleep and pain. I will refill these medications today.  Obtain labs and recheck in 6 months

## 2024-07-01 NOTE — ASSESSMENT & PLAN NOTE
Worsening.  She has started taking a low dose of melatonin at night with trazodone. She has no negative effects with this.  Melatonin helps her her fall asleep and trazodone keeps her asleep.  She is still not sleeping well.  She reports fatigue has become severe.  Increase trazodone to 75 mg nightly.  If this does not help we will need to see about sending her to sleep specialist.

## 2024-07-01 NOTE — ASSESSMENT & PLAN NOTE
Fingers and feet.  S/P R bunionectomy 7/2011*    Slight enlargement of the R 3rd pip. She has stiffness but minor pain. CMC joints have been painful. She has previously declined injection or referral.   She has had recent injections of bilateral knees with Dr. Gleason.  He has given her knee exercises.  Voltaren gel helps

## 2024-07-01 NOTE — ASSESSMENT & PLAN NOTE
She was with the back pain was worse.  There is radiation into upper legs.    Her back hurt with day-to-day activity and she felt like her back was weak.    She went to physical therapy for 3 months.  She continues exercises at home.  She has no relief.  MRI ordered of lumbar spine.

## 2024-07-01 NOTE — PROGRESS NOTES
Office Visit       Date: 07/01/2024   Patient Name: Maite Lopes  MRN: [id number]  YOB: 1955    Referring Physician: Referring, Self     Chief Complaint:   Chief Complaint   Patient presents with    Fibromyalgia    Osteoarthritis    osteopenia    Osteoporosis       History of Present Illness: Maite Lopes is a 68 y.o. female who is here today for follow-up of fibromyalgia, osteoarthritis, osteopenia and osteoporosis.  Today she reports feeling worse.  No recent injuries or infections.  She rates her pain 4 out of 10 and has 20 minutes of morning stiffness.  She would like a refill of trazodone and melatonin.      Subjective   Review of Systems:  Review of Systems   Constitutional:  Positive for fatigue and unexpected weight change.   HENT:  Positive for rhinorrhea.         Dry eyes   Genitourinary:         Nocturia   Musculoskeletal:  Positive for arthralgias, joint swelling, myalgias and neck pain.   Psychiatric/Behavioral:  Positive for sleep disturbance.    All other systems reviewed and are negative.       Past Medical History:   Past Medical History:   Diagnosis Date    Arthritis     Colon polyp     Diverticulitis     Fatigue     Fibromyalgia, primary     History of transfusion 1976    after child birth     Kidney infection 02/2019    TREATED WITH ABX    Osteoarthritis     Poor sleep pattern     Trigger finger     Tuberculosis 1961    MENINGITIS, HOSPITALIZED FOR 1 MONTH, FOLLOWED UP WITH HEALTH DEPARTMENT FOR MED TREATMENT    Vitamin D deficiency     Wears glasses        Past Surgical History:   Past Surgical History:   Procedure Laterality Date    BUNIONECTOMY  07/2011    COLONOSCOPY  07/2015    SEVERAL    CYSTOSCOPY N/A 04/03/2019    Procedure: CYSTOSCOPY;  Surgeon: Nuun Hilliard MD;  [facility];  Service: Obstetrics/Gynecology    HAMMER TOE REPAIR  07/2011    HAND SURGERY Right     HYSTEROSCOPY LAPAROSCOPY      for endometriosis x2     LAPAROSCOPIC ASSISTED  VAGINAL HYSTERECTOMY SALPINGO OOPHORECTOMY N/A 04/03/2019    Procedure: LAPAROSCOPIC ASSISTED VAGINAL HYSTERECTOMY WITH BILATERAL SALPINGO-OOPHORECTOMY, VAGINAL VAULT SUSPENSION, ANTERIOR VAGINAL REPAIR;  Surgeon: Nunu Hillirad MD;  Location: [facility];  Service: Obstetrics/Gynecology    OTHER SURGICAL HISTORY      TWO LAPAROSCOPIC SURGERIES FOR ENDOMETRIOSIS IN 1981 & 1985    SINUS SURGERY  04/1993    SINUS SURGERY  12/1993    SINUS SURGERY  08/2013       Family History:   Family History   Problem Relation Age of Onset    Other Mother         DUE TO RADIATION STUDY AS A CHILD    Hypertension Sister     Obesity Sister     Cancer Maternal Grandmother     Heart attack Maternal Grandmother     Hypertension Maternal Grandmother     Hyperlipidemia Maternal Grandmother     Obesity Maternal Grandmother     Cancer Maternal Grandfather     Arthritis Paternal Grandmother     Cancer Paternal Grandmother     Arthritis Maternal Uncle        Social History:   Social History     Socioeconomic History    Marital status:    Tobacco Use    Smoking status: Never    Smokeless tobacco: Never    Tobacco comments:     exposed to 2nd hand smoke as a child    Vaping Use    Vaping status: Never Used   Substance and Sexual Activity    Alcohol use: No    Drug use: No    Sexual activity: Defer       Medications:   Current Outpatient Medications:     Ascorbic Acid (VITAMIN C PO), Take 1 tablet by mouth Daily., Disp: , Rfl:     ascorbic acid (VITAMIN C) 500 MG capsule controlled-release CR capsule, Take 1 capsule by mouth Daily., Disp: , Rfl:     B Complex Vitamins (VITAMIN B COMPLEX PO), Take 1 tablet by mouth Daily., Disp: , Rfl:     baclofen (LIORESAL) 10 MG tablet, Take 1 tablet by mouth 3 (Three) Times a Day., Disp: 270 tablet, Rfl: 1    Coenzyme Q10 (COQ10 PO), Take 1 tablet by mouth Daily., Disp: , Rfl:     CRANBERRY PO, Take 400 mg by mouth Daily. 2 CAP DAILY, Disp: , Rfl:     denosumab (Prolia) 60 MG/ML solution  prefilled syringe syringe, 1 ml every 6 months, Disp: , Rfl:     EPINEPHrine (EPIPEN) 0.3 MG/0.3ML solution auto-injector injection, , Disp: , Rfl:     Ergocalciferol (VITAMIN D2 PO), Take 2 tablets by mouth 2 (Two) Times a Day., Disp: , Rfl:     estradiol (ESTRACE) 0.1 MG/GM vaginal cream, use one gram in the vagina 3 x weekly, Disp: , Rfl:     GARLIC PO, Take 1 tablet by mouth Daily., Disp: , Rfl:     hepatitis A (Havrix) 1440 EL U/ML vaccine, , Disp: , Rfl:     influenza vac split quad (FLUZONE,FLUARIX,AFLURIA,FLULAVAL) 0.5 ML suspension prefilled syringe injection, , Disp: , Rfl:     ipratropium (ATROVENT) 0.06 % nasal spray, Spray 2 sprays 3 times a day by intranasal route., Disp: , Rfl:     MegaRed Omega-3 Krill Oil 500 MG capsule, Take  by mouth Daily. As directed, Disp: , Rfl:     melatonin 3 MG tablet, 1 po qhs, Disp: , Rfl:     Probiotic Product (Align) 4 MG capsule, Take 1 capsule by mouth Daily., Disp: , Rfl:     traZODone (DESYREL) 50 MG tablet, Take 1.5 mg po nightly., Disp: 135 tablet, Rfl: 1    VITAMIN E 400 UNIT capsule, Take 1 capsule by mouth 2 (Two) Times a Day., Disp: , Rfl:     sodium-potassium-magnesium sulfates (Suprep Bowel Prep Kit) 17.5-3.13-1.6 GM/177ML solution oral solution, Take 2 bottles by mouth Take As Directed. Do Not Eat The Day Before Your Procedure. Call [phone] for questions., Disp: 354 mL, Rfl: 0    Allergies:   Allergies   Allergen Reactions    Aspirin Hives    Ceftin [Cefuroxime Axetil] Hives    Doxycycline Hives    Levaquin [Levofloxacin] Hives    Milk-Related Compounds Other (See Comments)     Allergic     Mucinex [Guaifenesin Er] Hives     From red dye    Red Dye Hives       I have reviewed and updated the patient's chief complaint, history of present illness, review of systems, past medical history, surgical history, family history, social history, medications and allergy list as appropriate.     Objective    Vital Signs:   Vitals:    07/01/24 1501   BP: 110/62   BP  "Location: Left arm   Patient Position: Sitting   Cuff Size: Adult   Pulse: 73   Temp: 97.3 °F (36.3 °C)   Weight: 49.3 kg (108 lb 9.6 oz)   Height: 160 cm (62.99\")   PainSc:   4   PainLoc: Back  Comment: lower     Body mass index is 19.24 kg/m².   BMI is within normal parameters. No other follow-up for BMI required.       Physical Exam:  Physical Exam  Vitals reviewed.   Constitutional:       Appearance: Normal appearance.   HENT:      Head: Normocephalic and atraumatic.      Mouth/Throat:      Mouth: Mucous membranes are moist.   Eyes:      Conjunctiva/sclera: Conjunctivae normal.   Cardiovascular:      Rate and Rhythm: Normal rate and regular rhythm.      Pulses: Normal pulses.      Heart sounds: Normal heart sounds.   Pulmonary:      Effort: Pulmonary effort is normal.      Breath sounds: Normal breath sounds.   Musculoskeletal:         General: Normal range of motion.      Cervical back: Normal range of motion and neck supple.      Comments: 18/18 myofascial tenderness  Lumbar spine tender.  Tender para cervical spine muscles.  Tender CMC joints.   Skin:     General: Skin is warm and dry.   Neurological:      General: No focal deficit present.      Mental Status: She is alert and oriented to person, place, and time. Mental status is at baseline.   Psychiatric:         Mood and Affect: Mood normal.         Behavior: Behavior normal.         Thought Content: Thought content normal.         Judgment: Judgment normal.          Results Review:   Imaging Results (Last 24 Hours)       ** No results found for the last 24 hours. **            Procedures    Assessment / Plan    Assessment/Plan:   Diagnoses and all orders for this visit:    1. Primary osteoarthritis involving multiple joints (Primary)  Assessment & Plan:   Fingers and feet.  S/P R bunionectomy 7/2011*    Slight enlargement of the R 3rd pip. She has stiffness but minor pain. CMC joints have been painful. She has previously declined injection or referral. "   She has had recent injections of bilateral knees with Dr. Gleason.  He has given her knee exercises.  Voltaren gel helps      2. Age-related osteoporosis without current pathological fracture  Assessment & Plan:   moderate. Intolerant of bisphosphonates.  Prolia started 12/18/19  Better on 12/21 scan. She is intolerant of bisphosphonates.  Prolia started 12/18/19. She tolerates it well.      Bone density scan on 6/17/2024 reviewed in [facility] today osteoporosis of the right femoral neck at -2.6.  Total right hip -1.6.  Total left hip -1.7 and left femoral neck -2.1.  Lumbar spine -1.0.    She has restarted Vitamin D.      Orders:  -     Vitamin D,25-Hydroxy; Future    3. Fibromyalgia  Assessment & Plan:  Still with fatigue and diffuse pain.   Overall better since longterm. Still with lots of stress associates with  her 's dementia.   Pt global is 3/10 and VAS is 3/10.  Elavil helps sleep. Baclofen helps sleep and pain. I will refill these medications today.  Obtain labs and recheck in 6 months    Orders:  -     baclofen (LIORESAL) 10 MG tablet; Take 1 tablet by mouth 3 (Three) Times a Day.  Dispense: 270 tablet; Refill: 1    4. Vitamin D deficiency  Assessment & Plan:  She stopped vitamin D supplement 2/2023 with vitamin D level of 90.5  Will repeat level today      5. Other fatigue  Assessment & Plan:  Worsening.  She has started taking a low dose of melatonin at night with trazodone. She has no negative effects with this.  Melatonin helps her her fall asleep and trazodone keeps her asleep.  She is still not sleeping well.  She reports fatigue has become severe.  Increase trazodone to 75 mg nightly.  If this does not help we will need to see about sending her to sleep specialist.    Orders:  -     Vitamin D,25-Hydroxy; Future    6. Cervical spine pain  Assessment & Plan:  No longer sees Dr. Shelton.  This is a chronic problem    She has occasional numbness of left hand.      7. Lumbar spine pain  Assessment &  Plan:  She was with the back pain was worse.  There is radiation into upper legs.    Her back hurt with day-to-day activity and she felt like her back was weak.    She went to physical therapy for 3 months.  She continues exercises at home.  She has no relief.  MRI ordered of lumbar spine.      Orders:  -     MRI Lumbar Spine Without Contrast; Future    8. Poor sleep pattern  Assessment & Plan:  Takes trazodone and melatonin.    Orders:  -     traZODone (DESYREL) 50 MG tablet; Take 1.5 mg po nightly.  Dispense: 135 tablet; Refill: 1        Follow Up:   Return in about 6 months (around 1/1/2025) for MARCELO Headley.        MARCELO Sosa  [facility]

## 2024-07-01 NOTE — ASSESSMENT & PLAN NOTE
moderate. Intolerant of bisphosphonates.  Prolia started 12/18/19  Better on 12/21 scan. She is intolerant of bisphosphonates.  Prolia started 12/18/19. She tolerates it well.      Bone density scan on 6/17/2024 reviewed in [facility] today osteoporosis of the right femoral neck at -2.6.  Total right hip -1.6.  Total left hip -1.7 and left femoral neck -2.1.  Lumbar spine -1.0.    She has restarted Vitamin D.

## 2024-07-02 LAB — 25(OH)D3 SERPL-MCNC: 58.9 NG/ML (ref 30–100)

## 2024-07-05 ENCOUNTER — HOSPITAL ENCOUNTER (OUTPATIENT)
Dept: MAMMOGRAPHY | Facility: HOSPITAL | Age: 69
Discharge: HOME | End: 2024-07-05
Admitting: OBSTETRICS & GYNECOLOGY
Payer: MEDICARE

## 2024-07-05 DIAGNOSIS — Z12.31 VISIT FOR SCREENING MAMMOGRAM: ICD-10-CM

## 2024-07-05 PROCEDURE — 77063 BREAST TOMOSYNTHESIS BI: CPT

## 2024-07-05 PROCEDURE — 77067 SCR MAMMO BI INCL CAD: CPT

## 2024-07-12 ENCOUNTER — TELEPHONE (OUTPATIENT)
Age: 69
End: 2024-07-12
Payer: MEDICARE

## 2024-07-12 NOTE — TELEPHONE ENCOUNTER
Hub staff attempted to follow warm transfer process and was unsuccessful     Caller: JOSE ALFREDO BRYSON    Relationship to patient: Other    Best call back number: [phone]    Patient is needing: [facility] WILL BE SENDING OVER 4 PLAN OF CARE NOTICES  ALONG WITH A DELAY FORM . EACH FORM WILL NEED TO HAVE 2 SIGNATURES ON THEM IN ORDER TO BE CONSIDERED VALID

## 2024-07-23 ENCOUNTER — HOSPITAL ENCOUNTER (OUTPATIENT)
Dept: MRI IMAGING | Facility: HOSPITAL | Age: 69
Discharge: HOME | End: 2024-07-23
Payer: MEDICARE

## 2024-07-23 ENCOUNTER — HOSPITAL ENCOUNTER (OUTPATIENT)
Dept: GENERAL RADIOLOGY | Facility: HOSPITAL | Age: 69
Discharge: HOME | End: 2024-07-23
Payer: MEDICARE

## 2024-07-23 DIAGNOSIS — M54.50 LUMBAR SPINE PAIN: ICD-10-CM

## 2024-07-23 PROCEDURE — 73560 X-RAY EXAM OF KNEE 1 OR 2: CPT

## 2024-07-24 ENCOUNTER — TELEPHONE (OUTPATIENT)
Age: 69
End: 2024-07-24
Payer: MEDICARE

## 2024-07-24 NOTE — TELEPHONE ENCOUNTER
I sent pt a message via [facility] letting her know that an MRI couldn't be done due to the sewing needle in her leg, but we could change it to a CT scan.  She said she'd like to hold off on CT scan for now. -Nissa Saini, A

## 2024-07-26 ENCOUNTER — TELEPHONE (OUTPATIENT)
Age: 69
End: 2024-07-26
Payer: MEDICARE

## 2024-07-26 NOTE — TELEPHONE ENCOUNTER
[facility] CALLED TO CONFIRM IF THE PTS PLAN OF CARE HAS BEEN FAXED OVER SIGNED BY DR. BINGHAM.     IF THERE IS ANY QUESTIONS PLEASE RE[facility] AT [phone]

## 2024-08-12 ENCOUNTER — LAB (OUTPATIENT)
Dept: LAB | Facility: HOSPITAL | Age: 69
End: 2024-08-12
Payer: MEDICARE

## 2024-08-12 DIAGNOSIS — M81.0 AGE-RELATED OSTEOPOROSIS WITHOUT CURRENT PATHOLOGICAL FRACTURE: ICD-10-CM

## 2024-08-12 DIAGNOSIS — M85.89 OSTEOPENIA OF MULTIPLE SITES: ICD-10-CM

## 2024-08-12 DIAGNOSIS — E55.9 VITAMIN D DEFICIENCY: Primary | ICD-10-CM

## 2024-08-12 DIAGNOSIS — M15.9 PRIMARY OSTEOARTHRITIS INVOLVING MULTIPLE JOINTS: ICD-10-CM

## 2024-08-12 PROCEDURE — 80053 COMPREHEN METABOLIC PANEL: CPT

## 2024-08-12 PROCEDURE — 36415 COLL VENOUS BLD VENIPUNCTURE: CPT

## 2024-08-13 LAB
ALBUMIN SERPL-MCNC: 4.1 G/DL (ref 3.5–5.2)
ALBUMIN/GLOB SERPL: 1.8 G/DL
ALP SERPL-CCNC: 69 U/L (ref 39–117)
ALT SERPL W P-5'-P-CCNC: 20 U/L (ref 1–33)
ANION GAP SERPL CALCULATED.3IONS-SCNC: 10 MMOL/L (ref 5–15)
AST SERPL-CCNC: 32 U/L (ref 1–32)
BILIRUB SERPL-MCNC: 0.3 MG/DL (ref 0–1.2)
BUN SERPL-MCNC: 12 MG/DL (ref 8–23)
BUN/CREAT SERPL: 16.2 (ref 7–25)
CALCIUM SPEC-SCNC: 9.5 MG/DL (ref 8.6–10.5)
CHLORIDE SERPL-SCNC: 101 MMOL/L (ref 98–107)
CO2 SERPL-SCNC: 30 MMOL/L (ref 22–29)
CREAT SERPL-MCNC: 0.74 MG/DL (ref 0.57–1)
EGFRCR SERPLBLD CKD-EPI 2021: 87.2 ML/MIN/1.73
GLOBULIN UR ELPH-MCNC: 2.3 GM/DL
GLUCOSE SERPL-MCNC: 87 MG/DL (ref 65–99)
POTASSIUM SERPL-SCNC: 3.5 MMOL/L (ref 3.5–5.2)
PROT SERPL-MCNC: 6.4 G/DL (ref 6–8.5)
SODIUM SERPL-SCNC: 141 MMOL/L (ref 136–145)

## 2024-12-25 DIAGNOSIS — G47.8 POOR SLEEP PATTERN: Chronic | ICD-10-CM

## 2024-12-27 RX ORDER — TRAZODONE HYDROCHLORIDE 50 MG/1
TABLET, FILM COATED ORAL
Qty: 135 TABLET | Refills: 0 | Status: SHIPPED | OUTPATIENT
Start: 2024-12-27

## 2025-01-21 NOTE — ASSESSMENT & PLAN NOTE
moderate. Intolerant of bisphosphonates.  Prolia started 12/18/19  Better on 12/21 scan. She is intolerant of bisphosphonates.  Prolia started 12/18/19. She tolerates it well.      Bone density scan on 6/17/2024 reviewed in epic showed osteoporosis of the right femoral neck at -2.6.  Total right hip -1.6.  Total left hip -1.7 and left femoral neck -2.1.  Lumbar spine -1.0.    Continue Prolia, Vitamin D and calcium supplements  Weight bearing exercises encouraged  She has an injection scheduled early February at [facility].  Labs ordered today.

## 2025-01-21 NOTE — ASSESSMENT & PLAN NOTE
Still with fatigue and diffuse pain.   Overall better since [facility]. Still with lots of stress associates with  her 's dementia.   Pt global is 3/10 and VAS is 3/10.  She is not sleeping well.  She sleeps maybe 3-4 hours..  Obtain labs and recheck in 6 months

## 2025-01-21 NOTE — ASSESSMENT & PLAN NOTE
She was with the back pain was worse.  There is radiation into upper legs.    Her back hurt with day-to-day activity and she felt like her back was weak.    She went to physical therapy for 3-4 months.  She continues exercises at home.  She has no relief.  Unable to have MRI do to part of needle in her knee since she was a child.  CT of spine ordered with and without contrast and pain has worsened.

## 2025-01-21 NOTE — ASSESSMENT & PLAN NOTE
Still not sleeping adequately.  She has started taking a low dose of melatonin at night with trazodone. She has no negative effects with this.  Melatonin helps her her fall asleep and trazodone keeps her asleep.  She is still not sleeping well.  She reports fatigue has become severe.  Continue trazodone.  Amitriptyline lost efficacy after 30 years..  She can ask PCP about lunesta.  She took ambien for a short period of time but stopped as she was worried about the addiction potential.

## 2025-01-21 NOTE — ASSESSMENT & PLAN NOTE
Fingers and feet.  S/P R bunionectomy 7/2011*    Slight enlargement of the R 3rd pip. She has stiffness but minor pain. She has previously declined injection or referral.   Can trial compression gloves  She has more thumb pain and sleeping in spika braces.  Continue follow-up with Dr Dutta, orthopedist.  She has had knee injections.  Voltaren gel helps

## 2025-01-22 ENCOUNTER — OFFICE VISIT (OUTPATIENT)
Age: 70
End: 2025-01-22
Payer: MEDICARE

## 2025-01-22 ENCOUNTER — TELEPHONE (OUTPATIENT)
Age: 70
End: 2025-01-22

## 2025-01-22 ENCOUNTER — LAB (OUTPATIENT)
Facility: HOSPITAL | Age: 70
End: 2025-01-22
Payer: MEDICARE

## 2025-01-22 VITALS
DIASTOLIC BLOOD PRESSURE: 70 MMHG | WEIGHT: 111 LBS | SYSTOLIC BLOOD PRESSURE: 100 MMHG | BODY MASS INDEX: 20.43 KG/M2 | HEART RATE: 72 BPM | HEIGHT: 62 IN | TEMPERATURE: 97.3 F

## 2025-01-22 DIAGNOSIS — E55.9 VITAMIN D DEFICIENCY: ICD-10-CM

## 2025-01-22 DIAGNOSIS — M54.2 CERVICAL SPINE PAIN: ICD-10-CM

## 2025-01-22 DIAGNOSIS — M54.50 LUMBAR SPINE PAIN: ICD-10-CM

## 2025-01-22 DIAGNOSIS — M15.0 PRIMARY OSTEOARTHRITIS INVOLVING MULTIPLE JOINTS: ICD-10-CM

## 2025-01-22 DIAGNOSIS — M81.0 AGE-RELATED OSTEOPOROSIS WITHOUT CURRENT PATHOLOGICAL FRACTURE: ICD-10-CM

## 2025-01-22 DIAGNOSIS — G47.8 POOR SLEEP PATTERN: Chronic | ICD-10-CM

## 2025-01-22 DIAGNOSIS — M79.7 FIBROMYALGIA: Primary | ICD-10-CM

## 2025-01-22 DIAGNOSIS — R53.83 OTHER FATIGUE: ICD-10-CM

## 2025-01-22 PROCEDURE — 36415 COLL VENOUS BLD VENIPUNCTURE: CPT

## 2025-01-22 PROCEDURE — 80053 COMPREHEN METABOLIC PANEL: CPT

## 2025-01-22 PROCEDURE — 82306 VITAMIN D 25 HYDROXY: CPT

## 2025-01-22 RX ORDER — TRAZODONE HYDROCHLORIDE 50 MG/1
TABLET, FILM COATED ORAL
Qty: 135 TABLET | Refills: 1 | Status: SHIPPED | OUTPATIENT
Start: 2025-01-22

## 2025-01-22 RX ORDER — BACLOFEN 10 MG/1
10 TABLET ORAL 3 TIMES DAILY
Qty: 270 TABLET | Refills: 1 | Status: SHIPPED | OUTPATIENT
Start: 2025-01-22

## 2025-01-22 NOTE — PROGRESS NOTES
Office Visit       Date: 01/22/2025   Patient Name: Maite Lopes  MRN: [id number]  YOB: 1955    Referring Physician: Amy Coley MD     Chief Complaint:   Chief Complaint   Patient presents with    Osteoarthritis    Osteoporosis    Fibromyalgia       History of Present Illness: Maite Lopes is a 69 y.o. female who is here today for ffollow-up of osteoporosis, osteoarthritis and fibromyalgia.  Patient reports feeling worse.  She rates her pain 3 out of 10 and has 30 minutes of morning stiffness.  She would like a refill of trazodone and baclofen today.  Her lumbar spine is much more painful.  She also has weakness of thoracic spine.      Subjective   Review of Systems:  Positive for fatigue, nosebleeds, postnasal drip, dry eyes, joint pain, back pain, joint swelling, muscle pain, neck pain, neck stiffness, environmental and food allergies, numbness, bruising bleeding easily, sleep disturbance.  All other review of symptoms are negative.    Past Medical History:   Past Medical History:   Diagnosis Date    Arthritis     Breast injury     hit in chest wall with baseball    Colon polyp     Diverticulitis     Fatigue     Fibromyalgia, primary     History of transfusion 1976    after child birth     Kidney infection 02/2019    TREATED WITH ABX    Osteoarthritis     Poor sleep pattern     Trigger finger     Tuberculosis 1961    MENINGITIS, HOSPITALIZED FOR 1 MONTH, FOLLOWED UP WITH HEALTH DEPARTMENT FOR MED TREATMENT    Vitamin D deficiency     Wears glasses        Past Surgical History:   Past Surgical History:   Procedure Laterality Date    BUNIONECTOMY  07/2011    COLONOSCOPY  07/2015    SEVERAL    CYSTOSCOPY N/A 04/03/2019    Procedure: CYSTOSCOPY;  Surgeon: Nunu Hilliard MD;  [facility];  Service: Obstetrics/Gynecology    HAMMER TOE REPAIR  07/2011    HAND SURGERY Right     HYSTEROSCOPY LAPAROSCOPY      for endometriosis x2     LAPAROSCOPIC ASSISTED VAGINAL  HYSTERECTOMY SALPINGO OOPHORECTOMY N/A 04/03/2019    Procedure: LAPAROSCOPIC ASSISTED VAGINAL HYSTERECTOMY WITH BILATERAL SALPINGO-OOPHORECTOMY, VAGINAL VAULT SUSPENSION, ANTERIOR VAGINAL REPAIR;  Surgeon: Nunu Hilliard MD;  Location: [facility];  Service: Obstetrics/Gynecology    OOPHORECTOMY      OTHER SURGICAL HISTORY      TWO LAPAROSCOPIC SURGERIES FOR ENDOMETRIOSIS IN 1981 & 1985    SINUS SURGERY  04/1993    SINUS SURGERY  12/1993    SINUS SURGERY  08/2013       Family History:   Family History   Problem Relation Age of Onset    Other Mother         DUE TO RADIATION STUDY AS A CHILD    Hypertension Sister     Obesity Sister     Breast cancer Maternal Grandmother     Cancer Maternal Grandmother     Heart attack Maternal Grandmother     Hypertension Maternal Grandmother     Hyperlipidemia Maternal Grandmother     Obesity Maternal Grandmother     Arthritis Paternal Grandmother     Cancer Paternal Grandmother     Cancer Maternal Grandfather     Arthritis Maternal Uncle     Ovarian cancer Neg Hx        Social History:   Social History     Socioeconomic History    Marital status:    Tobacco Use    Smoking status: Never    Smokeless tobacco: Never    Tobacco comments:     exposed to 2nd hand smoke as a child    Vaping Use    Vaping status: Never Used   Substance and Sexual Activity    Alcohol use: No    Drug use: No    Sexual activity: Defer       Medications:   Current Outpatient Medications:     Ascorbic Acid (VITAMIN C PO), Take 1 tablet by mouth Daily., Disp: , Rfl:     ascorbic acid (VITAMIN C) 500 MG capsule controlled-release CR capsule, Take 1 capsule by mouth Daily., Disp: , Rfl:     B Complex Vitamins (VITAMIN B COMPLEX PO), Take 1 tablet by mouth Daily., Disp: , Rfl:     baclofen (LIORESAL) 10 MG tablet, Take 1 tablet by mouth 3 (Three) Times a Day., Disp: 270 tablet, Rfl: 1    BUDESONIDE PO, EMPTY CONTENTS OF ONE CAPSULE INTO NETIFLO, ADD DISTILLED WATER, AND IRRIGATE 1-2 TIMES A DAY,  Disp: , Rfl:     Coenzyme Q10 (COQ10 PO), Take 1 tablet by mouth Daily., Disp: , Rfl:     CRANBERRY PO, Take 400 mg by mouth Daily. 2 CAP DAILY, Disp: , Rfl:     denosumab (Prolia) 60 MG/ML solution prefilled syringe syringe, 1 ml every 6 months, Disp: , Rfl:     EPINEPHrine (EPIPEN) 0.3 MG/0.3ML solution auto-injector injection, , Disp: , Rfl:     Ergocalciferol (VITAMIN D2 PO), Take 2 tablets by mouth 2 (Two) Times a Day., Disp: , Rfl:     estradiol (ESTRACE) 0.1 MG/GM vaginal cream, use one gram in the vagina 3 x weekly, Disp: , Rfl:     GARLIC PO, Take 1 tablet by mouth Daily., Disp: , Rfl:     hepatitis A (Havrix) 1440 EL U/ML vaccine, , Disp: , Rfl:     influenza vac split quad (FLUZONE,FLUARIX,AFLURIA,FLULAVAL) 0.5 ML suspension prefilled syringe injection, , Disp: , Rfl:     MegaRed Omega-3 Krill Oil 500 MG capsule, Take  by mouth Daily. As directed, Disp: , Rfl:     melatonin 3 MG tablet, 1 po qhs, Disp: , Rfl:     Probiotic Product (Align) 4 MG capsule, Take 1 capsule by mouth Daily., Disp: , Rfl:     traZODone (DESYREL) 50 MG tablet, TAKE 1 AND 1/2 TABLET BY MOUTH ONCE NIGHTLY, Disp: 135 tablet, Rfl: 1    VITAMIN E 400 UNIT capsule, Take 1 capsule by mouth 2 (Two) Times a Day., Disp: , Rfl:     Allergies:   Allergies   Allergen Reactions    Aspirin Hives    Ceftin [Cefuroxime Axetil] Hives    Doxycycline Hives    Levaquin [Levofloxacin] Hives    Milk-Related Compounds Other (See Comments)     Allergic     Mucinex [Guaifenesin Er] Hives     From red dye    Red Dye #40 (Allura Red) Hives       I have reviewed and updated the patient's chief complaint, history of present illness, review of systems, past medical history, surgical history, family history, social history, medications and allergy list as appropriate.     Objective    Vital Signs:   Vitals:    01/22/25 1029   BP: 100/70   BP Location: Left arm   Patient Position: Sitting   Cuff Size: Adult   Pulse: 72   Temp: 97.3 °F (36.3 °C)   Weight: 50.3 kg  "(111 lb)   Height: 157.5 cm (62.01\")   PainSc:   3     Body mass index is 20.3 kg/m².   BMI is within normal parameters. No other follow-up for BMI required.       Physical Exam:  Physical Exam  Vitals reviewed.   Constitutional:       Appearance: Normal appearance.   HENT:      Head: Normocephalic and atraumatic.      Mouth/Throat:      Mouth: Mucous membranes are moist.   Eyes:      Conjunctiva/sclera: Conjunctivae normal.   Cardiovascular:      Rate and Rhythm: Normal rate and regular rhythm.      Pulses: Normal pulses.      Heart sounds: Normal heart sounds.   Pulmonary:      Effort: Pulmonary effort is normal.      Breath sounds: Normal breath sounds.   Musculoskeletal:         General: Normal range of motion.      Cervical back: Normal range of motion and neck supple.      Comments: CMC squaring bilaterally  [place] and stacey bilaterally  No synovitis or soft tissue swelling  Crepitus bilaeral knees   Skin:     General: Skin is warm and dry.   Neurological:      General: No focal deficit present.      Mental Status: She is alert and oriented to person, place, and time. Mental status is at baseline.   Psychiatric:         Mood and Affect: Mood normal.         Behavior: Behavior normal.         Thought Content: Thought content normal.         Judgment: Judgment normal.          Results Review:   Imaging Results (Last 24 Hours)       ** No results found for the last 24 hours. **            Procedures    Assessment / Plan    Assessment/Plan:   Diagnoses and all orders for this visit:    1. Fibromyalgia (Primary)  Assessment & Plan:  Still with fatigue and diffuse pain.   Overall better since prison. Still with lots of stress associates with  her 's dementia.   Pt global is 3/10 and VAS is 3/10.  She is not sleeping well.  She sleeps maybe 3-4 hours..  Obtain labs and recheck in 6 months    Orders:  -     baclofen (LIORESAL) 10 MG tablet; Take 1 tablet by mouth 3 (Three) Times a Day.  Dispense: 270 " tablet; Refill: 1    2. Primary osteoarthritis involving multiple joints  Assessment & Plan:   Fingers and feet.  S/P R bunionectomy 7/2011*    Slight enlargement of the R 3rd pip. She has stiffness but minor pain. She has previously declined injection or referral.   Can trial compression gloves  She has more thumb pain and sleeping in spika braces.  Continue follow-up with Dr Dutta, orthopedist.  She has had knee injections.  Voltaren gel helps      3. Vitamin D deficiency  Assessment & Plan:  She stopped vitamin D supplement 2/2023 with vitamin D level of 90.5  Will repeat level today      4. Poor sleep pattern  Assessment & Plan:  Takes trazodone and melatonin.  Refill trazodone.    Orders:  -     traZODone (DESYREL) 50 MG tablet; TAKE 1 AND 1/2 TABLET BY MOUTH ONCE NIGHTLY  Dispense: 135 tablet; Refill: 1    5. Other fatigue  Assessment & Plan:  Still not sleeping adequately.  She has started taking a low dose of melatonin at night with trazodone. She has no negative effects with this.  Melatonin helps her her fall asleep and trazodone keeps her asleep.  She is still not sleeping well.  She reports fatigue has become severe.  Continue trazodone.  Amitriptyline lost efficacy after 30 years..  She can ask PCP about lunesta.  She took ambien for a short period of time but stopped as she was worried about the addiction potential.      6. Lumbar spine pain  Assessment & Plan:  She was with the back pain was worse.  There is radiation into upper legs.    Her back hurt with day-to-day activity and she felt like her back was weak.    She went to physical therapy for 3-4 months.  She continues exercises at home.  She has no relief.  Unable to have MRI do to part of needle in her knee since she was a child.  CT of spine ordered with and without contrast and pain has worsened.    Orders:  -     CT Lumbar Spine With & Without Contrast; Future    7. Cervical spine pain  Assessment & Plan:  No longer sees Dr. Shelton.  This is  a chronic problem    She has occasional numbness of left hand.      8. Age-related osteoporosis without current pathological fracture  Assessment & Plan:   moderate. Intolerant of bisphosphonates.  Prolia started 12/18/19  Better on 12/21 scan. She is intolerant of bisphosphonates.  Prolia started 12/18/19. She tolerates it well.      Bone density scan on 6/17/2024 reviewed in epic showed osteoporosis of the right femoral neck at -2.6.  Total right hip -1.6.  Total left hip -1.7 and left femoral neck -2.1.  Lumbar spine -1.0.    Continue Prolia, Vitamin D and calcium supplements  Weight bearing exercises encouraged  She has an injection scheduled early February at [facility].  Labs ordered today.      Orders:  -     Vitamin D,25-Hydroxy; Future  -     Comprehensive Metabolic Panel; Future        Follow Up:   Return in about 6 months (around 7/22/2025) for Dr. Ortiz, MARCELO Headley.        MARCELO Sosa  [facility]

## 2025-01-23 LAB
25(OH)D3 SERPL-MCNC: 59.5 NG/ML (ref 30–100)
ALBUMIN SERPL-MCNC: 4.2 G/DL (ref 3.5–5.2)
ALBUMIN/GLOB SERPL: 1.7 G/DL
ALP SERPL-CCNC: 60 U/L (ref 39–117)
ALT SERPL W P-5'-P-CCNC: 23 U/L (ref 1–33)
ANION GAP SERPL CALCULATED.3IONS-SCNC: 11 MMOL/L (ref 5–15)
AST SERPL-CCNC: 35 U/L (ref 1–32)
BILIRUB SERPL-MCNC: 0.5 MG/DL (ref 0–1.2)
BUN SERPL-MCNC: 15 MG/DL (ref 8–23)
BUN/CREAT SERPL: 19.7 (ref 7–25)
CALCIUM SPEC-SCNC: 9.5 MG/DL (ref 8.6–10.5)
CHLORIDE SERPL-SCNC: 102 MMOL/L (ref 98–107)
CO2 SERPL-SCNC: 28 MMOL/L (ref 22–29)
CREAT SERPL-MCNC: 0.76 MG/DL (ref 0.57–1)
EGFRCR SERPLBLD CKD-EPI 2021: 84.4 ML/MIN/1.73
GLOBULIN UR ELPH-MCNC: 2.5 GM/DL
GLUCOSE SERPL-MCNC: 85 MG/DL (ref 65–99)
POTASSIUM SERPL-SCNC: 4 MMOL/L (ref 3.5–5.2)
PROT SERPL-MCNC: 6.7 G/DL (ref 6–8.5)
SODIUM SERPL-SCNC: 141 MMOL/L (ref 136–145)

## 2025-01-30 ENCOUNTER — HOSPITAL ENCOUNTER (OUTPATIENT)
Dept: CT IMAGING | Facility: HOSPITAL | Age: 70
Discharge: HOME | End: 2025-01-30
Admitting: NURSE PRACTITIONER
Payer: MEDICARE

## 2025-01-30 DIAGNOSIS — M54.50 LUMBAR SPINE PAIN: ICD-10-CM

## 2025-01-30 PROCEDURE — 25510000001 IOPAMIDOL PER 1 ML: Performed by: NURSE PRACTITIONER

## 2025-01-30 PROCEDURE — 72133 CT LUMBAR SPINE W/O & W/DYE: CPT

## 2025-01-30 RX ORDER — IOPAMIDOL 755 MG/ML
100 INJECTION, SOLUTION INTRAVASCULAR
Status: COMPLETED | OUTPATIENT
Start: 2025-01-30 | End: 2025-01-30

## 2025-01-30 RX ADMIN — IOPAMIDOL 85 ML: 755 INJECTION, SOLUTION INTRAVENOUS at 14:39

## 2025-02-05 ENCOUNTER — TELEPHONE (OUTPATIENT)
Age: 70
End: 2025-02-05
Payer: MEDICARE

## 2025-02-05 DIAGNOSIS — M47.816 OSTEOARTHRITIS OF LUMBAR SPINE, UNSPECIFIED SPINAL OSTEOARTHRITIS COMPLICATION STATUS: Primary | ICD-10-CM

## 2025-02-12 ENCOUNTER — TELEPHONE (OUTPATIENT)
Age: 70
End: 2025-02-12
Payer: MEDICARE

## 2025-02-12 NOTE — TELEPHONE ENCOUNTER
Hub staff attempted to follow warm transfer process and was unsuccessful     Caller: Maite Lopes    Relationship to patient: Self    Best call back number: [phone]    Patient is needing: PATIENT IS WANTING TO FOLLOW-UP ON HER REFERRAL THE MITA HILTON APRN HAD PLACED AFTER HER CT SCAN. PATIENT STATES ANYTIME IS OKAY TO CALL AND VOICE MAILS ARE ACCEPTED. PLEASE ADVISE.

## 2025-02-17 NOTE — TELEPHONE ENCOUNTER
CALLED TO CHECK ON THE STATUS OF THE PATIENT'S REFERRAL.  DR. KC'S OFFICE DOESN'T EXCEPT FAXED REFERRALS.  THEY SCHEDULED OVER THE PHONE.  THE PATIENT IS SCHEDULED FOR 2/26 AT 1:00PM.  THEY ASKED THAT I REFAX CT REPORT AND DEMOGRAPHICS AND THEY WANT TO PATIENT TO BRING CD OF IMAGING ALONG WITH INSURANCE AND PHOTO ID.  CALLED PATIENT AND GAVE HER THAT INFORMATION.

## 2025-03-11 ENCOUNTER — OFFICE VISIT (OUTPATIENT)
Age: 70
End: 2025-03-11
Payer: MEDICARE

## 2025-03-11 VITALS
HEART RATE: 68 BPM | RESPIRATION RATE: 14 BRPM | BODY MASS INDEX: 19.51 KG/M2 | OXYGEN SATURATION: 96 % | WEIGHT: 106 LBS | SYSTOLIC BLOOD PRESSURE: 124 MMHG | HEIGHT: 62 IN | TEMPERATURE: 97.8 F | DIASTOLIC BLOOD PRESSURE: 62 MMHG

## 2025-03-11 DIAGNOSIS — F51.01 PRIMARY INSOMNIA: Primary | ICD-10-CM

## 2025-03-11 DIAGNOSIS — Z51.81 ENCOUNTER FOR THERAPEUTIC DRUG MONITORING: ICD-10-CM

## 2025-03-11 RX ORDER — ESZOPICLONE 1 MG/1
1 TABLET, FILM COATED ORAL NIGHTLY
Qty: 30 TABLET | Refills: 2 | Status: SHIPPED | OUTPATIENT
Start: 2025-03-11

## 2025-03-11 NOTE — PROGRESS NOTES
Chief Complaint   Patient presents with   • Establish Care       Subjective   Maite Lopes is a 69 y.o. female    History of Present Illness  Patient in to establish care.  She does have chronic osteoporosis, arthritic type changes and fibromyalgia for which she does see rheumatology.  She did have an x-ray of her knee 7/23/2024 which showed a foreign object presumed to be a needle in her right knee. She does not have any issues with her knee, Is getting a steroid injection in her back on the 18 th. Sometimes back is severe, other times not as bad. Has issues with insomnia, has tried amitriptyline, Trazodone, these do not keep her asleep. Melatonin puts her asleep but she awakens. Zyrtec helps her sleep, but causes grogginess the next day. Her pain is much better with a good night sleep. She took Lunesta in the past and worked well.     Allergies   Allergen Reactions   • Aspirin Hives   • Ceftin [Cefuroxime Axetil] Hives   • Doxycycline Hives   • Egg-Derived Products Hives   • Levaquin [Levofloxacin] Hives   • Milk-Related Compounds Other (See Comments)     Allergic    • Mucinex [Guaifenesin Er] Hives     From red dye   • Red Dye #40 (Allura Red) Hives     Past Medical History:   Diagnosis Date   • Allergic     Since childhood   • Arthritis    • Breast injury     hit in chest wall with baseball   • Colon polyp    • Diverticulitis    • Fatigue    • Fibromyalgia, primary    • History of transfusion 1976    after child birth    • Kidney infection 02/2019    TREATED WITH ABX   • Low back pain 2015   • Lumbar herniated disc    • Osteoarthritis    • Osteopenia 2024   • Pneumonia 1989   • Poor sleep pattern    • Trigger finger    • Tuberculosis 1961    MENINGITIS, HOSPITALIZED FOR 1 MONTH, FOLLOWED UP WITH HEALTH DEPARTMENT FOR MED TREATMENT   • Vitamin D deficiency    • Wears glasses       Past Surgical History:   Procedure Laterality Date   • BUNIONECTOMY  07/2011   • COLONOSCOPY  07/2015    SEVERAL   • CYSTOSCOPY  N/A 04/03/2019    Procedure: CYSTOSCOPY;  Surgeon: Nunu Hilliard MD;  Location:  [facility];  Service: Obstetrics/Gynecology   • ENDOMETRIAL ABLATION  1981   • HAMMER TOE REPAIR  07/2011   • HAND SURGERY Right    • HYSTEROSCOPY LAPAROSCOPY      for endometriosis x2    • LAPAROSCOPIC ASSISTED VAGINAL HYSTERECTOMY SALPINGO OOPHORECTOMY N/A 04/03/2019    Procedure: LAPAROSCOPIC ASSISTED VAGINAL HYSTERECTOMY WITH BILATERAL SALPINGO-OOPHORECTOMY, VAGINAL VAULT SUSPENSION, ANTERIOR VAGINAL REPAIR;  Surgeon: Nunu Hilliard MD;  Location:  [facility];  Service: Obstetrics/Gynecology   • OOPHORECTOMY     • OTHER SURGICAL HISTORY      TWO LAPAROSCOPIC SURGERIES FOR ENDOMETRIOSIS IN 1981 & 1985   • SINUS SURGERY  04/1993   • SINUS SURGERY  12/1993   • SINUS SURGERY  08/2013     Social History     Socioeconomic History   • Marital status:    Tobacco Use   • Smoking status: Never     Passive exposure: Past   • Smokeless tobacco: Never   • Tobacco comments:     exposed to 2nd hand smoke as a child    Vaping Use   • Vaping status: Never Used   Substance and Sexual Activity   • Alcohol use: No   • Drug use: No   • Sexual activity: Not Currently     Partners: Male     Birth control/protection: Condom, Birth control pill, Hysterectomy        Current Outpatient Medications:   •  Ascorbic Acid (VITAMIN C PO), Take 1 tablet by mouth Daily., Disp: , Rfl:   •  ascorbic acid (VITAMIN C) 500 MG capsule controlled-release CR capsule, Take 1 capsule by mouth Daily., Disp: , Rfl:   •  B Complex Vitamins (VITAMIN B COMPLEX PO), Take 1 tablet by mouth Daily., Disp: , Rfl:   •  BUDESONIDE PO, EMPTY CONTENTS OF ONE CAPSULE INTO NETIFLO, ADD DISTILLED WATER, AND IRRIGATE 1-2 TIMES A DAY, Disp: , Rfl:   •  Coenzyme Q10 (COQ10 PO), Take 1 tablet by mouth Daily., Disp: , Rfl:   •  CRANBERRY PO, Take 400 mg by mouth Daily. 2 CAP DAILY, Disp: , Rfl:   •  denosumab (Prolia) 60 MG/ML solution prefilled syringe syringe, 1 ml every 6  months, Disp: , Rfl:   •  estradiol (ESTRACE) 0.1 MG/GM vaginal cream, use one gram in the vagina 3 x weekly, Disp: , Rfl:   •  GARLIC PO, Take 1 tablet by mouth Daily., Disp: , Rfl:   •  hepatitis A (Havrix) 1440 EL U/ML vaccine, , Disp: , Rfl:   •  MegaRed Omega-3 Krill Oil 500 MG capsule, Take  by mouth Daily. As directed, Disp: , Rfl:   •  melatonin 3 MG tablet, 1 po qhs, Disp: , Rfl:   •  Probiotic Product (Align) 4 MG capsule, Take 1 capsule by mouth Daily., Disp: , Rfl:   •  VITAMIN E 400 UNIT capsule, Take 1 capsule by mouth 2 (Two) Times a Day., Disp: , Rfl:   •  eszopiclone (LUNESTA) 1 MG tablet, Take 1 tablet by mouth Every Night. Take immediately before bedtime, Disp: 30 tablet, Rfl: 2     Review of Systems   Constitutional:  Negative for appetite change, chills, fatigue and fever.   HENT:  Negative for congestion, ear pain, hearing loss, rhinorrhea, sinus pressure and sore throat.    Eyes:  Negative for itching and visual disturbance (glasses,).   Respiratory:  Negative for cough, shortness of breath and wheezing.    Cardiovascular:  Negative for chest pain, palpitations and leg swelling.   Gastrointestinal:  Negative for abdominal pain, blood in stool, constipation, diarrhea, nausea and vomiting.   Endocrine: Positive for cold intolerance. Negative for heat intolerance, polydipsia, polyphagia and polyuria.   Genitourinary:  Negative for difficulty urinating, dysuria and hematuria.   Musculoskeletal:  Positive for arthralgias, back pain and myalgias. Negative for joint swelling and neck pain.   Skin:  Negative for rash and wound.   Allergic/Immunologic: Negative for environmental allergies and food allergies.   Neurological:  Positive for numbness (tingling in fingers). Negative for dizziness, light-headedness and headaches.   Hematological:  Negative for adenopathy. Does not bruise/bleed easily.   Psychiatric/Behavioral:  Positive for sleep disturbance. Negative for dysphoric mood. The patient is not  nervous/anxious.        Objective     Vitals:    03/11/25 1336   BP: 124/62   Pulse: 68   Resp: 14   Temp: 97.8 °F (36.6 °C)   SpO2: 96%         03/11/25  1336   Weight: 48.1 kg (106 lb)     Body mass index is 19.39 kg/m².  Results for orders placed or performed in visit on 01/22/25   Vitamin D,25-Hydroxy    Collection Time: 01/22/25 11:16 AM    Specimen: Blood   Result Value Ref Range    25 Hydroxy, Vitamin D 59.5 30.0 - 100.0 ng/ml   Comprehensive Metabolic Panel    Collection Time: 01/22/25 11:16 AM    Specimen: Blood   Result Value Ref Range    Glucose 85 65 - 99 mg/dL    BUN 15 8 - 23 mg/dL    Creatinine 0.76 0.57 - 1.00 mg/dL    Sodium 141 136 - 145 mmol/L    Potassium 4.0 3.5 - 5.2 mmol/L    Chloride 102 98 - 107 mmol/L    CO2 28.0 22.0 - 29.0 mmol/L    Calcium 9.5 8.6 - 10.5 mg/dL    Total Protein 6.7 6.0 - 8.5 g/dL    Albumin 4.2 3.5 - 5.2 g/dL    ALT (SGPT) 23 1 - 33 U/L    AST (SGOT) 35 (H) 1 - 32 U/L    Alkaline Phosphatase 60 39 - 117 U/L    Total Bilirubin 0.5 0.0 - 1.2 mg/dL    Globulin 2.5 gm/dL    A/G Ratio 1.7 g/dL    BUN/Creatinine Ratio 19.7 7.0 - 25.0    Anion Gap 11.0 5.0 - 15.0 mmol/L    eGFR 84.4 >60.0 mL/min/1.73       Physical Exam  Vitals reviewed.   Constitutional:       Appearance: She is well-developed.   HENT:      Head: Normocephalic and atraumatic.   Cardiovascular:      Rate and Rhythm: Normal rate and regular rhythm.      Heart sounds: Normal heart sounds.   Pulmonary:      Effort: Pulmonary effort is normal.      Breath sounds: Normal breath sounds.   Skin:     General: Skin is warm and dry.   Neurological:      Mental Status: She is alert and oriented to person, place, and time.   Psychiatric:         Behavior: Behavior normal.       Assessment & Plan   Problems Addressed this Visit          Sleep    Primary insomnia - Primary    Relevant Medications    eszopiclone (LUNESTA) 1 MG tablet     Other Visit Diagnoses         Encounter for therapeutic drug monitoring        Relevant  Orders    ToxAssure Flex 23, Ur -          Diagnoses         Codes Comments      Primary insomnia    -  Primary ICD-10-CM: F51.01  ICD-9-CM: 307.42       Encounter for therapeutic drug monitoring     ICD-10-CM: Z51.81  ICD-9-CM: V58.83         As part of this patient's treatment plan I am prescribing controlled substances. The patient has been made aware of appropriate use of such medications, including potential risk of somnolence, limited ability to drive and /or work safely, and potential for dependence or overdose. It has also been made clear that these medications are for use by this patient only, without concomitant use of alcohol or other substances unless prescribed.  Patient has completed prescribing agreement detailing terms of continued prescribing of controlled substances, including monitoring DEJA reports, urine drug screening, and pill counts if necessary. The patient is aware that inappropriate use will result in cessation of prescribing such medications.  DEJA report has been reviewed and scanned into the patient's chart.  History and physical exam exhibit continued safe and appropriate use of controlled substances at this time. This patient appears to have a low risk of dependence at this time.         Return visit in 1 month    Counseling provided on insomnia.    Kodi Haroon Askew, APRN   3/11/2025   14:15 EDT     Please note that portions of this document were completed with a voice recognition program.     At [facility], we believe that sharing information builds trust and better relationships. You are receiving this note because you are receiving care at [facility] or have recently visited. It is possible you will see health information before a provider has talked with you about it. This kind of information can be easy to misunderstand as it is a medical document. It is intended as [id number] communication. It is written in medical language and may contain abbreviations or  verbiage that are unfamiliar. It may appear blunt or direct. Medical documents are intended to carry relevant information, facts as evident, and the clinical opinion of the practitioner.  To help you fully understand what it means for your health, we urge you to discuss this note with your provider.

## 2025-03-14 ENCOUNTER — APPOINTMENT (OUTPATIENT)
Dept: GENERAL RADIOLOGY | Facility: HOSPITAL | Age: 70
End: 2025-03-14
Payer: MEDICARE

## 2025-03-14 ENCOUNTER — APPOINTMENT (OUTPATIENT)
Dept: MRI IMAGING | Facility: HOSPITAL | Age: 70
End: 2025-03-14
Payer: MEDICARE

## 2025-03-14 ENCOUNTER — PATIENT ROUNDING (BHMG ONLY) (OUTPATIENT)
Age: 70
End: 2025-03-14
Payer: MEDICARE

## 2025-03-14 ENCOUNTER — APPOINTMENT (OUTPATIENT)
Dept: CT IMAGING | Facility: HOSPITAL | Age: 70
End: 2025-03-14
Payer: MEDICARE

## 2025-03-14 ENCOUNTER — HOSPITAL ENCOUNTER (EMERGENCY)
Facility: HOSPITAL | Age: 70
Discharge: HOME | End: 2025-03-14
Attending: EMERGENCY MEDICINE
Payer: MEDICARE

## 2025-03-14 VITALS
HEART RATE: 80 BPM | RESPIRATION RATE: 19 BRPM | DIASTOLIC BLOOD PRESSURE: 80 MMHG | BODY MASS INDEX: 19.51 KG/M2 | TEMPERATURE: 98.7 F | HEIGHT: 62 IN | OXYGEN SATURATION: 99 % | WEIGHT: 106 LBS | SYSTOLIC BLOOD PRESSURE: 146 MMHG

## 2025-03-14 DIAGNOSIS — G43.009 ATYPICAL MIGRAINE: ICD-10-CM

## 2025-03-14 DIAGNOSIS — R20.0 NUMBNESS AND TINGLING: Primary | ICD-10-CM

## 2025-03-14 DIAGNOSIS — R03.0 ELEVATED BLOOD PRESSURE READING: ICD-10-CM

## 2025-03-14 DIAGNOSIS — R20.2 NUMBNESS AND TINGLING: Primary | ICD-10-CM

## 2025-03-14 LAB
ALT SERPL W P-5'-P-CCNC: 24 U/L (ref 1–33)
APTT PPP: 30.2 SECONDS (ref 22–39)
AST SERPL-CCNC: 37 U/L (ref 1–32)
BASOPHILS # BLD AUTO: 0.06 10*3/MM3 (ref 0–0.2)
BASOPHILS NFR BLD AUTO: 0.6 % (ref 0–1.5)
BILIRUB UR QL STRIP: NEGATIVE
BUN BLDA-MCNC: 13 MG/DL (ref 8–26)
CA-I BLDA-SCNC: 1.19 MMOL/L (ref 1.2–1.32)
CHLORIDE BLDA-SCNC: 104 MMOL/L (ref 98–109)
CLARITY UR: CLEAR
CO2 BLDA-SCNC: 24 MMOL/L (ref 24–29)
COLOR UR: YELLOW
CREAT BLDA-MCNC: 0.7 MG/DL (ref 0.6–1.3)
DEPRECATED RDW RBC AUTO: 41.6 FL (ref 37–54)
EGFRCR SERPLBLD CKD-EPI 2021: 93.2 ML/MIN/1.73
EOSINOPHIL # BLD AUTO: 0.09 10*3/MM3 (ref 0–0.4)
EOSINOPHIL NFR BLD AUTO: 0.9 % (ref 0.3–6.2)
ERYTHROCYTE [DISTWIDTH] IN BLOOD BY AUTOMATED COUNT: 12.7 % (ref 12.3–15.4)
GLUCOSE BLDC GLUCOMTR-MCNC: 95 MG/DL (ref 70–130)
GLUCOSE UR STRIP-MCNC: NEGATIVE MG/DL
HCT VFR BLD AUTO: 41.8 % (ref 34–46.6)
HCT VFR BLDA CALC: 43 % (ref 38–51)
HGB BLD-MCNC: 14 G/DL (ref 12–15.9)
HGB BLDA-MCNC: 14.6 G/DL (ref 12–17)
HGB UR QL STRIP.AUTO: NEGATIVE
HOLD SPECIMEN: NORMAL
IMM GRANULOCYTES # BLD AUTO: 0.02 10*3/MM3 (ref 0–0.05)
IMM GRANULOCYTES NFR BLD AUTO: 0.2 % (ref 0–0.5)
INR PPP: 0.97 (ref 0.89–1.12)
KETONES UR QL STRIP: NEGATIVE
LEUKOCYTE ESTERASE UR QL STRIP.AUTO: NEGATIVE
LYMPHOCYTES # BLD AUTO: 3.9 10*3/MM3 (ref 0.7–3.1)
LYMPHOCYTES NFR BLD AUTO: 37.4 % (ref 19.6–45.3)
MCH RBC QN AUTO: 30.2 PG (ref 26.6–33)
MCHC RBC AUTO-ENTMCNC: 33.5 G/DL (ref 31.5–35.7)
MCV RBC AUTO: 90.3 FL (ref 79–97)
MONOCYTES # BLD AUTO: 0.6 10*3/MM3 (ref 0.1–0.9)
MONOCYTES NFR BLD AUTO: 5.8 % (ref 5–12)
NEUTROPHILS NFR BLD AUTO: 5.75 10*3/MM3 (ref 1.7–7)
NEUTROPHILS NFR BLD AUTO: 55.1 % (ref 42.7–76)
NITRITE UR QL STRIP: NEGATIVE
NRBC BLD AUTO-RTO: 0 /100 WBC (ref 0–0.2)
PH UR STRIP.AUTO: 8.5 [PH] (ref 5–8)
PLATELET # BLD AUTO: 239 10*3/MM3 (ref 140–450)
PMV BLD AUTO: 10.1 FL (ref 6–12)
POTASSIUM BLDA-SCNC: 3.6 MMOL/L (ref 3.5–4.9)
PROT UR QL STRIP: NEGATIVE
PROTHROMBIN TIME: 13 SECONDS (ref 12.2–14.5)
QT INTERVAL: 406 MS
QTC INTERVAL: 447 MS
RBC # BLD AUTO: 4.63 10*6/MM3 (ref 3.77–5.28)
SODIUM BLD-SCNC: 141 MMOL/L (ref 138–146)
SP GR UR STRIP: 1.02 (ref 1–1.03)
UROBILINOGEN UR QL STRIP: ABNORMAL
WBC NRBC COR # BLD AUTO: 10.42 10*3/MM3 (ref 3.4–10.8)
WHOLE BLOOD HOLD COAG: NORMAL
WHOLE BLOOD HOLD SPECIMEN: NORMAL

## 2025-03-14 PROCEDURE — 70496 CT ANGIOGRAPHY HEAD: CPT

## 2025-03-14 PROCEDURE — 84450 TRANSFERASE (AST) (SGOT): CPT | Performed by: EMERGENCY MEDICINE

## 2025-03-14 PROCEDURE — 25010000002 DIPHENHYDRAMINE PER 50 MG

## 2025-03-14 PROCEDURE — 0042T HC CT CEREBRAL PERFUSION W/WO CONTRAST: CPT

## 2025-03-14 PROCEDURE — 85025 COMPLETE CBC W/AUTO DIFF WBC: CPT | Performed by: EMERGENCY MEDICINE

## 2025-03-14 PROCEDURE — 96375 TX/PRO/DX INJ NEW DRUG ADDON: CPT

## 2025-03-14 PROCEDURE — 85014 HEMATOCRIT: CPT | Performed by: EMERGENCY MEDICINE

## 2025-03-14 PROCEDURE — 85610 PROTHROMBIN TIME: CPT | Performed by: EMERGENCY MEDICINE

## 2025-03-14 PROCEDURE — 84460 ALANINE AMINO (ALT) (SGPT): CPT | Performed by: EMERGENCY MEDICINE

## 2025-03-14 PROCEDURE — 25010000002 PROCHLORPERAZINE 10 MG/2ML SOLUTION

## 2025-03-14 PROCEDURE — 70498 CT ANGIOGRAPHY NECK: CPT

## 2025-03-14 PROCEDURE — 81003 URINALYSIS AUTO W/O SCOPE: CPT | Performed by: EMERGENCY MEDICINE

## 2025-03-14 PROCEDURE — 99285 EMERGENCY DEPT VISIT HI MDM: CPT

## 2025-03-14 PROCEDURE — 80047 BASIC METABLC PNL IONIZED CA: CPT | Performed by: EMERGENCY MEDICINE

## 2025-03-14 PROCEDURE — 70450 CT HEAD/BRAIN W/O DYE: CPT

## 2025-03-14 PROCEDURE — 93005 ELECTROCARDIOGRAM TRACING: CPT | Performed by: EMERGENCY MEDICINE

## 2025-03-14 PROCEDURE — 99284 EMERGENCY DEPT VISIT MOD MDM: CPT

## 2025-03-14 PROCEDURE — 71045 X-RAY EXAM CHEST 1 VIEW: CPT

## 2025-03-14 PROCEDURE — 85730 THROMBOPLASTIN TIME PARTIAL: CPT | Performed by: EMERGENCY MEDICINE

## 2025-03-14 PROCEDURE — 96365 THER/PROPH/DIAG IV INF INIT: CPT

## 2025-03-14 PROCEDURE — 96366 THER/PROPH/DIAG IV INF ADDON: CPT

## 2025-03-14 PROCEDURE — 25510000001 IOPAMIDOL PER 1 ML: Performed by: EMERGENCY MEDICINE

## 2025-03-14 PROCEDURE — 25010000002 MAGNESIUM SULFATE 2 GM/50ML SOLUTION

## 2025-03-14 RX ORDER — DIPHENHYDRAMINE HYDROCHLORIDE 50 MG/ML
12.5 INJECTION INTRAMUSCULAR; INTRAVENOUS ONCE
Status: COMPLETED | OUTPATIENT
Start: 2025-03-14 | End: 2025-03-14

## 2025-03-14 RX ORDER — IOPAMIDOL 755 MG/ML
115 INJECTION, SOLUTION INTRAVASCULAR
Status: COMPLETED | OUTPATIENT
Start: 2025-03-14 | End: 2025-03-14

## 2025-03-14 RX ORDER — SODIUM CHLORIDE 0.9 % (FLUSH) 0.9 %
10 SYRINGE (ML) INJECTION AS NEEDED
Status: DISCONTINUED | OUTPATIENT
Start: 2025-03-14 | End: 2025-03-14 | Stop reason: HOSPADM

## 2025-03-14 RX ORDER — PROCHLORPERAZINE EDISYLATE 5 MG/ML
10 INJECTION INTRAMUSCULAR; INTRAVENOUS ONCE
Status: COMPLETED | OUTPATIENT
Start: 2025-03-14 | End: 2025-03-14

## 2025-03-14 RX ORDER — MAGNESIUM SULFATE HEPTAHYDRATE 40 MG/ML
2 INJECTION, SOLUTION INTRAVENOUS ONCE
Status: COMPLETED | OUTPATIENT
Start: 2025-03-14 | End: 2025-03-14

## 2025-03-14 RX ADMIN — IOPAMIDOL 115 ML: 755 INJECTION, SOLUTION INTRAVENOUS at 13:26

## 2025-03-14 RX ADMIN — DIPHENHYDRAMINE HYDROCHLORIDE 12.5 MG: 50 INJECTION INTRAMUSCULAR; INTRAVENOUS at 13:48

## 2025-03-14 RX ADMIN — PROCHLORPERAZINE EDISYLATE 10 MG: 5 INJECTION INTRAMUSCULAR; INTRAVENOUS at 13:48

## 2025-03-14 RX ADMIN — MAGNESIUM SULFATE HEPTAHYDRATE 2 G: 40 INJECTION, SOLUTION INTRAVENOUS at 13:52

## 2025-03-14 NOTE — CONSULTS
Stroke Consult Note    Patient Name: Maite Lopes   MRN: [id number]  Age: 69 y.o.  Sex: female  : 1955    Primary Care Physician: Kodi Askew APRN  Referring Physician:  Zheng Herrera MD    TIME STROKE TEAM CALLED: 1300 EST     TIME PATIENT SEEN: 1305 EST    Handedness: Right  Race:     Chief Complaint/Reason for Consultation: left hand / face paresthesia    HPI: Maite Lopes is a 68 yo female with PMH of fibromyalgia, cervical spine pain, chronic sleep deficit, and anxiety who presents to [facility] ED via private vehicle with complaints of facial paresthesia and paresthesia of left hand.  Patient reported approximately noon she was driving on the road today when she began experiencing symptoms.  Symptoms began with painful bilateral occipital headache radiating to neck and behind eyes, accompanied by a brief period of blurred vision. She reports that she does not often get headaches. she reports headache quickly resolved however symptoms lingered.  She has not experienced anything quite like this in the past.  She does reports that she recently started Lunesta approximately 2 nights ago and has continued to have sleepless nights.  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  Patient is not on any antiplatelets or anticoagulation and has an allergy to aspirin which causes her hives.  On exam patient NIH is 1 due to some sensory deficit of left hand.  Patient states that paresthesia of face is crossing back-and-forth from left to right.  Additionally at time of exam she felt like she had bilateral paresthesias in both hips.  No focal weakness.  No dystaxia.  No language or visual deficits.  Patient has no complaints of dizziness.  I did walk her to and from the CT scan and patient reported normal gait.  Emergent CT head without evidence of hemorrhage or acute abnormality.  Following CT head I did discuss with patient  risk and benefits of IV thrombolytics given that she is within timeframe for giving TNK.  The patient stated that she does not view her numbness and left hand as disabling and would not want to take additional risk of bleed for the nondisabling symptoms.  CT perfusion without core infarct or ischemic tissue at risk.  No LVO identified on CT head and no significant flow-limiting stenosis.     Given symptoms suspected to be related to complex migraine versus stress-induced we will give migraine cocktail in ED and obtain MRI to rule out stroke.     Last Known Normal Date/Time: 1200 EST     Review of Systems   Constitutional:  Negative for fatigue and fever.   HENT:  Negative for congestion, nosebleeds, sinus pressure, sinus pain and voice change.    Eyes:  Negative for photophobia and visual disturbance.   Respiratory:  Negative for cough and shortness of breath.    Cardiovascular:  Negative for chest pain and palpitations.   Gastrointestinal:  Negative for nausea and vomiting.   Genitourinary: Negative.    Musculoskeletal:  Positive for back pain.   Neurological:  Positive for numbness and headaches. Negative for dizziness, tremors, seizures, syncope, facial asymmetry, speech difficulty, weakness and light-headedness.   Hematological: Negative.    Psychiatric/Behavioral:  Negative for agitation and confusion. The patient is nervous/anxious.       Past Medical History:   Diagnosis Date    Allergic     Since childhood    Arthritis     Breast injury     hit in chest wall with baseball    Colon polyp     Diverticulitis     Fatigue     Fibromyalgia, primary     History of transfusion 1976    after child birth     Kidney infection 02/2019    TREATED WITH ABX    Low back pain 2015    Lumbar herniated disc     Osteoarthritis     Osteopenia 2024    Pneumonia 1989    Poor sleep pattern     Trigger finger     Tuberculosis 1961    MENINGITIS, HOSPITALIZED FOR 1 MONTH, FOLLOWED UP WITH HEALTH DEPARTMENT FOR MED TREATMENT     Vitamin D deficiency     Wears glasses      Past Surgical History:   Procedure Laterality Date    BUNIONECTOMY  07/2011    COLONOSCOPY  07/2015    SEVERAL    CYSTOSCOPY N/A 04/03/2019    Procedure: CYSTOSCOPY;  Surgeon: Nunu Hilliard MD;  Location:  [facility];  Service: Obstetrics/Gynecology    ENDOMETRIAL ABLATION  1981    HAMMER TOE REPAIR  07/2011    HAND SURGERY Right     HYSTEROSCOPY LAPAROSCOPY      for endometriosis x2     LAPAROSCOPIC ASSISTED VAGINAL HYSTERECTOMY SALPINGO OOPHORECTOMY N/A 04/03/2019    Procedure: LAPAROSCOPIC ASSISTED VAGINAL HYSTERECTOMY WITH BILATERAL SALPINGO-OOPHORECTOMY, VAGINAL VAULT SUSPENSION, ANTERIOR VAGINAL REPAIR;  Surgeon: Nunu Hilliard MD;  Location:  [facility];  Service: Obstetrics/Gynecology    OOPHORECTOMY      OTHER SURGICAL HISTORY      TWO LAPAROSCOPIC SURGERIES FOR ENDOMETRIOSIS IN 1981 & 1985    SINUS SURGERY  04/1993    SINUS SURGERY  12/1993    SINUS SURGERY  08/2013     Family History   Problem Relation Age of Onset    Hypertension Sister     Obesity Sister     Breast cancer Maternal Grandmother     Cancer Maternal Grandmother     Heart attack Maternal Grandmother     Hypertension Maternal Grandmother     Hyperlipidemia Maternal Grandmother     Obesity Maternal Grandmother     Arthritis Paternal Grandmother     Cancer Paternal Grandmother     Cancer Maternal Grandfather     Arthritis Maternal Uncle     Heart disease Maternal Uncle     Ovarian cancer Neg Hx      Social History     Socioeconomic History    Marital status:    Tobacco Use    Smoking status: Never     Passive exposure: Past    Smokeless tobacco: Never    Tobacco comments:     exposed to 2nd hand smoke as a child    Vaping Use    Vaping status: Never Used   Substance and Sexual Activity    Alcohol use: No    Drug use: No    Sexual activity: Not Currently     Partners: Male     Birth control/protection: Condom, Birth control pill, Hysterectomy     Allergies   Allergen Reactions     Aspirin Hives    Ceftin [Cefuroxime Axetil] Hives    Doxycycline Hives    Egg-Derived Products Hives    Levaquin [Levofloxacin] Hives    Milk-Related Compounds Other (See Comments)     Allergic     Mucinex [Guaifenesin Er] Hives     From red dye    Red Dye #40 (Allura Red) Hives     Prior to Admission medications    Medication Sig Start Date End Date Taking? Authorizing Provider   Ascorbic Acid (VITAMIN C PO) Take 1 tablet by mouth Daily.    Mariano Du MD   ascorbic acid (VITAMIN C) 500 MG capsule controlled-release CR capsule Take 1 capsule by mouth Daily.    Mariano Du MD   B Complex Vitamins (VITAMIN B COMPLEX PO) Take 1 tablet by mouth Daily.    Mariano Du MD   BUDESONIDE PO EMPTY CONTENTS OF ONE CAPSULE INTO NETIFLO, ADD DISTILLED WATER, AND IRRIGATE 1-2 TIMES A DAY 1/8/25   Mariano Du MD   Coenzyme Q10 (COQ10 PO) Take 1 tablet by mouth Daily.    Mariano Du MD   CRANBERRY PO Take 400 mg by mouth Daily. 2 CAP DAILY    Mariano Du MD   denosumab (Prolia) 60 MG/ML solution prefilled syringe syringe 1 ml every 6 months    Mariano Du MD   estradiol (ESTRACE) 0.1 MG/GM vaginal cream use one gram in the vagina 3 x weekly 5/28/24   Mariano Du MD   eszopiclone (LUNESTA) 1 MG tablet Take 1 tablet by mouth Every Night. Take immediately before bedtime 3/11/25   Kodi Askew APRN   GARLIC PO Take 1 tablet by mouth Daily.    Mariano Du MD   hepatitis A (Havrix) 1440 EL U/ML vaccine     Provider, Historical, MD   MegaRed Omega-3 Krill Oil 500 MG capsule Take  by mouth Daily. As directed    Mariano Du MD   melatonin 3 MG tablet 1 po qhs    Mariano Du MD   Probiotic Product (Align) 4 MG capsule Take 1 capsule by mouth Daily.    Mariano Du MD   VITAMIN E 400 UNIT capsule Take 1 capsule by mouth 2 (Two) Times a Day.    Mariano Du MD         Temp:  [98.7 °F (37.1 °C)] 98.7 °F  (37.1 °C)  Heart Rate:  [81] 81  Resp:  [19] 19  BP: (161)/(95) 161/95  Neurological Exam  Mental Status  Alert. Oriented to person, place, time and situation. Oriented to person, place, and time. Speech is normal. Language is fluent with no aphasia. Attention and concentration are normal. Fund of knowledge is appropriate for level of education.    Cranial Nerves  CN II: Visual fields full to confrontation.  CN III, IV, VI: Extraocular movements intact bilaterally. Normal lids and orbits bilaterally. Pupils equal round and reactive to light bilaterally.  CN V:  Right: Abnormal facial sensation:  Left: Abnormal facial sensation: Patient reports paresthesias crossing back-and-forth between left and right lower face.  CN VII:  Right: There is no facial weakness.  Left: There is no facial weakness.    Motor  Normal muscle bulk throughout. No fasciculations present. Normal muscle tone. Strength is 5/5 throughout all four extremities.    Sensory  Light touch abnormality: Patient reports paresthesias to face bilaterally and to left hand.     Coordination  Right: Finger-to-nose normal. Heel-to-shin normal.Left: Finger-to-nose normal. Heel-to-shin normal.    Gait  Casual gait is normal including stance, stride, and arm swing.      Physical Exam  Constitutional:       General: She is not in acute distress.     Appearance: She is not ill-appearing.   HENT:      Head: Normocephalic and atraumatic.      Mouth/Throat:      Pharynx: Oropharynx is clear.   Eyes:      General: Lids are normal.      Extraocular Movements: Extraocular movements intact.      Pupils: Pupils are equal, round, and reactive to light.   Cardiovascular:      Rate and Rhythm: Normal rate.   Pulmonary:      Effort: Pulmonary effort is normal. No respiratory distress.   Abdominal:      General: There is no distension.      Tenderness: There is no guarding.   Musculoskeletal:         General: No signs of injury. Normal range of motion.      Right lower leg: No  edema.      Left lower leg: No edema.   Skin:     General: Skin is warm and dry.      Findings: No bruising or lesion.   Neurological:      Mental Status: She is alert and oriented to person, place, and time.      Cranial Nerves: No cranial nerve deficit.      Sensory: Sensory deficit present.      Motor: Motor strength is normal.No weakness.      Coordination: Coordination normal.   Psychiatric:         Speech: Speech normal.         Acute Stroke Data    Thrombolytic Inclusion / Exclusion Criteria    Time: 13:21 EDT  Person Administering Scale: RANJANA Dickson risk and benefits reviewed with patient. She does not feel symptoms are disabling and declined IV thrombolytics.     YES NO INCLUSION CRITERIA CLASS I   [] [x]   Suspected diagnosis of acute ischemic stroke with measureable neurological deficit.  Low NIHSS with disabling stroke symptoms. (Not disabling per patient)   [x] []   Onset of stroke symptoms < 3 hours before beginning treatment >/ 18 years old  Stroke symptom onset = time patient was last seen well or without symptoms (LKW)   [] []   Onset of symptoms between 3-4.5 hours: >/= 80 years old (safe Class IIa) with history of   both diabetes and prior CVA  (reasonable Class IIb) AND NIHSS </= 25  *If not eligible for IV Thrombolytic consider neuro intervention for LKW within 24 hours     YES NO EXCLUSION CRITERIA (CONTRAINDICATIONS) CLASS III EVIDENCE HARM   [] []   Blood pressure >185/110 medically refractory to IV medications   [] []   Active bleeding at a non-compressible site   [] []   Active intracranial hemorrhage (ICH)   [] []   Symptoms suggestive of subarachnoid hemorrhage (SAH)   [] []   GI bleed within 21 days   [] []   Ischemic stroke within 3 months   [] []   Severe head trauma within 3 months    [] []   Intracranial or intraspinal surgery within 3 months   [] []   Current GI malignancy   [] []   Intracranial neoplasm   [] []   Infective endocarditis   [] []   Aortic arch  dissection   [] []   Active coagulopathy with  INR >1.7, platelets <100,000, PTT > 40 sec, PT > 15 sec   *For warfarin, administration can begin before blood tests resulted. Discontinue for above values.    [] []   Treatment dose* of LMWH (Lovenox) in last 24 hours  *prophylactic dosages are not a contraindication   [] []   Concurrent use of antiplatelet agents' glycoprotein inhibitors IIb/IIIa (Integrilin, etc.)   [] []   Thrombin or factor Xa inhibitors (Eliquis, Xarelto, Arixtra) taken in last 48 hours     YES NO CLASS II: AIS WITH THE FOLLOWING CONDITIONS -   TREATMENT RISKS SHOULD BE WEIGHED AGAINST POSSIBLE BENEFITS.    [] []   Major trauma in last 14 days, recent major surgery in last 14 days, intracranial arterial dissection, giant unruptured and unsecured intracranial aneurysm, pericarditis     [] []   The risks and benefits have been discussed with the patient or family related to the administration of IV thrombolytic therapy for stroke symptoms.   [] []   I have discussed and reviewed the patient's case and imaging with the attending prior to IV thrombolytic therapy.   TIME N/A Time IV thrombolytic administered       Hospital Meds:  Scheduled-    Infusions-     PRNs-   sodium chloride    Functional Status Prior to Current Stroke/Burt Score:   MODIFIED TIM SCALE (to be assessed for each patient having history of stroke) []Stroke history but not assessed  [x]0: No symptoms at all  []1: No significant disability despite symptoms  []2: Slight disability  []3: Moderate disability  []4: Moderately severe disability  []5: Severe disability  []6: Death        NIH Stroke Scale  Time: 13:21 EDT  Person Administering Scale: Gene Elliott PA-C    1a  Level of consciousness: 0=alert; keenly responsive   1b. LOC questions:  0=Answers both questions correctly   1c. LOC commands: 0=Performs both tasks correctly   2.  Best Gaze: 0=normal   3.  Visual: 0=No visual loss   4. Facial Palsy: 0=Normal symmetric movement    5a.  Motor left arm: 0=No drift, limb holds 90 (or 45) degrees for full 10 seconds   5b.  Motor right arm: 0=No drift, limb holds 90 (or 45) degrees for full 10 seconds   6a. motor left le=No drift, limb holds 90 (or 45) degrees for full 10 seconds   6b  Motor right le=No drift, limb holds 90 (or 45) degrees for full 10 seconds   7. Limb Ataxia: 0=Absent   8.  Sensory: 1=Mild to moderate sensory loss; patient feels pinprick is less sharp or is dull on the affected side; there is a loss of superficial pain with pinprick but patient is aware She is being touched   9. Best Language:  0=No aphasia, normal   10. Dysarthria: 0=Normal   11. Extinction and Inattention: 0=No abnormality    Total:   1       Results Reviewed:  I have personally reviewed current lab, radiology, and data and agree with results.    CT Head Without Contrast Stroke Protocol  Result Date: 3/14/2025  Impression: No acute intracranial findings. Electronically Signed: Fernando Rodney MD  3/14/2025 1:26 PM EDT  Workstation ID: [id number]     CBC w/diff          3/14/2025    13:12 3/14/2025    13:15   CBC w/Diff   WBC 10.42     RBC 4.63     Hemoglobin 14.0  14.6    Hematocrit 41.8  43    MCV 90.3     MCH 30.2     MCHC 33.5     RDW 12.7     Platelets 239     Neutrophil Rel % 55.1     Immature Granulocyte Rel % 0.2     Lymphocyte Rel % 37.4     Monocyte Rel % 5.8     Eosinophil Rel % 0.9     Basophil Rel % 0.6        Lab Results   Component Value Date    GLUCOSE 85 2025    BUN 15 2025    CREATININE 0.70 2025     2025    K 4.0 2025     2025    CALCIUM 9.5 2025    PROTEINTOT 6.7 2025    ALBUMIN 4.2 2025    ALT 24 2025    AST 37 (H) 2025    ALKPHOS 60 2025    BILITOT 0.5 2025    GLOB 2.5 2025    AGRATIO 1.7 2025    BCR 19.7 2025    ANIONGAP 11.0 2025    EGFR 93.2 2025        Assessment/Plan:  70 yo female with PMH of fibromyalgia,  cervical spine pain, chronic sleep deficit, and anxiety who presents to [facility] ED via private vehicle with complaints of facial paresthesia and paresthesia of left hand.  LKW 1200 EST.  Symptoms began with painful bilateral occipital headache adiating to neck and behind eyes, accompanied by a brief period of blurred vision Started Lunesta approximately 2 nights ago and has continued to have sleepless nights.  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  NIH 1 Emergent CT head without evidence of hemorrhage or acute abnormality.  Discussed with patient risk and benefits of IV thrombolytics given that she is within timeframe for giving TNK.  The patient stated that she does not view her numbness and left hand as disabling and would not want to take additional risk of bleed for the nondisabling symptoms.  CT perfusion without core infarct or ischemic tissue at risk.  No LVO identified on CT head and no significant flow-limiting stenosis.    Antiplatelet PTA: none  Anticoagulant PTA: none      # Left hand paresthesia /bilateral face paresthesia  # Occipital neuralgia / migraine  -Suspect differential related to complex migraine or stress-induced.  Suspicion for stroke lower at this time however cannot be ruled out until MRI is obtained  -CT head without acute abnormalities or hemorrhage  -CT angiogram head and neck without flow-limiting stenosis and no perfusion deficits observed  -Will obtain MRI from ED to evaluate for acute ischemic stroke  -Migraine cocktail of Compazine, Benadryl, and mag sulfate given in ED  -No aspirin as patient has allergic reaction with hives  -If MRI is positive for AIS , will initiate stroke order set and recommend patient be admitted for further evaluation.  Consider addition of Plavix load if MRI positive for AIS  -If MRI is negative for AIS and patient's symptoms resolve in ED, would recommend that she follow as  outpatient with her primary care provider with recommendations to establish care with psychiatry/behavioral health for significant increase stress and anxiety  -Neurology stroke will follow pending results with further recommendations at that time    # Hypertension  -May normalize blood pressure goals at this time  -Please avoid hypotension    Neurology stroke will follow pending MRI.  Plan of care discussed with patient, patient's family, and Dr. Herrera.  Please call with any additional questions or concerns    Gene Elliott PA-C  March 14, 2025  13:21 EDT     Addendum: Unable to continue with MRI per Dr. Jasso due to needle in pt knee. I reevaluated the patient again in ED and her symptoms have completely resolved following migraine cocktail.  Patient reports feeling at her baseline level of function and she is requesting discharge home. I suspect a complex migraine given symptoms started with occipital neuralgia and resolved with migraine cocktail. She has also been under significant stress. Recommend follow with PCP and discuss referral to psych or behavioral health. Some of the bilateral face and hip numbness was not consistent with stroke. Given overall history and symptom resolved after migraine meds, I do not think it is necessary to follow in stroke clinic for TIA.  Signs and symptoms of stroke reviewed with patient and family and they agree to call 911 or seek emergent help for any new stroke-like signs or symptoms.  Patient had no additional questions or concerns at this time.

## 2025-03-14 NOTE — ED PROVIDER NOTES
Subjective   History of Present Illness  Patient is a 69-year-old female presenting to the emergency department approximately an hour and a half of some left sided facial numbness and left arm numbness.  Patient does report the numbness on the face tends to transition on either side of the mouth.  No other acute deficits.  Patient does have a history of fibromyalgia.  No history of strokes.  No history of atrial fibrillation.  Patient is not on any anticoagulation.  No other acute deficits reported.    History provided by:  Patient      Review of Systems    Past Medical History:   Diagnosis Date    Allergic     Since childhood    Arthritis     Breast injury     hit in chest wall with baseball    Colon polyp     Diverticulitis     Fatigue     Fibromyalgia, primary     History of transfusion 1976    after child birth     Kidney infection 02/2019    TREATED WITH ABX    Low back pain 2015    Lumbar herniated disc     Osteoarthritis     Osteopenia 2024    Pneumonia 1989    Poor sleep pattern     Trigger finger     Tuberculosis 1961    MENINGITIS, HOSPITALIZED FOR 1 MONTH, FOLLOWED UP WITH HEALTH DEPARTMENT FOR MED TREATMENT    Vitamin D deficiency     Wears glasses        Allergies   Allergen Reactions    Aspirin Hives    Ceftin [Cefuroxime Axetil] Hives    Doxycycline Hives    Egg-Derived Products Hives    Levaquin [Levofloxacin] Hives    Milk-Related Compounds Other (See Comments)     Allergic     Mucinex [Guaifenesin Er] Hives     From red dye    Red Dye #40 (Allura Red) Hives       Past Surgical History:   Procedure Laterality Date    BUNIONECTOMY  07/2011    COLONOSCOPY  07/2015    SEVERAL    CYSTOSCOPY N/A 04/03/2019    Procedure: CYSTOSCOPY;  Surgeon: Nunu Hilliard MD;  Location: [facility];  Service: Obstetrics/Gynecology    ENDOMETRIAL ABLATION  1981    HAMMER TOE REPAIR  07/2011    HAND SURGERY Right     HYSTEROSCOPY LAPAROSCOPY      for endometriosis x2     LAPAROSCOPIC ASSISTED VAGINAL HYSTERECTOMY  SALPINGO OOPHORECTOMY N/A 04/03/2019    Procedure: LAPAROSCOPIC ASSISTED VAGINAL HYSTERECTOMY WITH BILATERAL SALPINGO-OOPHORECTOMY, VAGINAL VAULT SUSPENSION, ANTERIOR VAGINAL REPAIR;  Surgeon: Nunu Hilliard MD;  Location: [facility];  Service: Obstetrics/Gynecology    OOPHORECTOMY      OTHER SURGICAL HISTORY      TWO LAPAROSCOPIC SURGERIES FOR ENDOMETRIOSIS IN 1981 & 1985    SINUS SURGERY  04/1993    SINUS SURGERY  12/1993    SINUS SURGERY  08/2013       Family History   Problem Relation Age of Onset    Hypertension Sister     Obesity Sister     Breast cancer Maternal Grandmother     Cancer Maternal Grandmother     Heart attack Maternal Grandmother     Hypertension Maternal Grandmother     Hyperlipidemia Maternal Grandmother     Obesity Maternal Grandmother     Arthritis Paternal Grandmother     Cancer Paternal Grandmother     Cancer Maternal Grandfather     Arthritis Maternal Uncle     Heart disease Maternal Uncle     Ovarian cancer Neg Hx        Social History     Socioeconomic History    Marital status:    Tobacco Use    Smoking status: Never     Passive exposure: Past    Smokeless tobacco: Never    Tobacco comments:     exposed to 2nd hand smoke as a child    Vaping Use    Vaping status: Never Used   Substance and Sexual Activity    Alcohol use: No    Drug use: No    Sexual activity: Not Currently     Partners: Male     Birth control/protection: Condom, Birth control pill, Hysterectomy           Objective   Physical Exam  Vitals and nursing note reviewed.   Constitutional:       General: She is not in acute distress.     Appearance: Normal appearance. She is not toxic-appearing.   Cardiovascular:      Rate and Rhythm: Normal rate and regular rhythm.      Pulses: Normal pulses.   Pulmonary:      Effort: Pulmonary effort is normal. No respiratory distress.      Breath sounds: Normal breath sounds.   Musculoskeletal:         General: Normal range of motion.   Neurological:      General: No focal  deficit present.      Mental Status: She is alert and oriented to person, place, and time.      Comments: No focal asymmetry.  Speech is intact.  Normal finger-nose.  Patient reports subjective numbness though sensation is reported as equal on comparison.  No drift.  No strength deficit.  NIH stroke scale is 0.   Psychiatric:         Mood and Affect: Mood normal.         Behavior: Behavior normal.         Procedures           ED Course  ED Course as of 03/14/25 2045   Fri Mar 14, 2025   1326 Creatinine: 0.70  Renal function within normal limits. [RS]   1326 CT Head Without Contrast Stroke Protocol  I personally reviewed the CT images of the head as they are being performed.  I also reviewed them with the radiologist at bedside.  On my interpretation there is no hemorrhage or mass effect.  No acute findings per radiology. [RS]   1326 I evaluated the patient in the CT scanner with the stroke navigator.  He did talk with the patient about the consideration of thrombolysis secondary to her timing.  Patient has very minor symptoms at this point.  After discussion of the risks and benefits the patient states she does not want thrombolysis at this time.  We will reassess after the perfusion imaging. [RS]   1358 CT CEREBRAL PERFUSION WITH & WITHOUT CONTRAST  I personally reviewed the CT perfusion imaging.  On my interpretation there is no ischemic penumbra or core infarct visualized [RS]   1618 Patient had complete resolution of symptoms with a migraine cocktail.  Patient was rechecked by the stroke navigator.  Patient is not able to get the MRI secondary to a needle that is within one of the needs.  The patient talked with the stroke navigator and she wants to go home.  Patient does not want to be admitted or further evaluation.  Will discharge with symptoms likely representing complex migraine. I have reviewed results, considerations, and diagnosis with the patient and/or their representative. Anticipatory guidance  provided. Follow-up plan reviewed. Precautions for acute return for re-evaluations also reviewed. This including potential for worsening of the presenting condition and need for further evaluation, admission, and/or intervention as indicated. Opportunity to as questions provided. I advised them to return for any concerns and stressed the importance of timely follow-up and outpatient services. They verbalized understanding.   [RS]   3833 Note to patient: The 21st Century Cures Act makes medical notes like these available to patients in the interest of transparency. However, be advised this is a medical document. It is intended as peer to peer communication. It is written in medical language and may contain abbreviations or verbiage that are unfamiliar. It may appear blunt or direct. Medical documents are intended to carry relevant information, facts as evident, and the clinical opinion of the physician/NPP.   [RS]      ED Course User Index  [RS] Zheng Herrera MD                                                       Medical Decision Making  Problems Addressed:  Atypical migraine: complicated acute illness or injury  Elevated blood pressure reading: complicated acute illness or injury  Numbness and tingling: complicated acute illness or injury    Amount and/or Complexity of Data Reviewed  Independent Historian: caregiver  Labs: ordered. Decision-making details documented in ED Course.  Radiology: ordered. Decision-making details documented in ED Course.  ECG/medicine tests: ordered.  Discussion of management or test interpretation with external provider(s): Stroke Team    Risk  Prescription drug management.  Decision regarding hospitalization.        Final diagnoses:   Numbness and tingling   Atypical migraine   Elevated blood pressure reading       ED Disposition  ED Disposition       ED Disposition   Discharge    Condition   Stable    Comment   --               Kodi Askew, APRN  [address]  [address] [address]  [phone]    Schedule an appointment as soon as possible for a visit       [facility] EMERGENCY DEPARTMENT  [address]  [facility] [address] [phone]    As needed, If symptoms worsen or ANY concerns.         Medication List      No changes were made to your prescriptions during this visit.            Zheng Herrera MD  03/14/25 8002

## 2025-03-18 ENCOUNTER — OFFICE VISIT (OUTPATIENT)
Age: 70
End: 2025-03-18
Payer: MEDICARE

## 2025-03-18 VITALS
HEART RATE: 65 BPM | HEIGHT: 62 IN | RESPIRATION RATE: 18 BRPM | SYSTOLIC BLOOD PRESSURE: 102 MMHG | BODY MASS INDEX: 19.69 KG/M2 | WEIGHT: 107 LBS | DIASTOLIC BLOOD PRESSURE: 58 MMHG | OXYGEN SATURATION: 98 %

## 2025-03-18 DIAGNOSIS — F51.01 PRIMARY INSOMNIA: ICD-10-CM

## 2025-03-18 DIAGNOSIS — F41.9 ANXIETY: Primary | ICD-10-CM

## 2025-03-18 PROCEDURE — 1160F RVW MEDS BY RX/DR IN RCRD: CPT | Performed by: NURSE PRACTITIONER

## 2025-03-18 PROCEDURE — 99214 OFFICE O/P EST MOD 30 MIN: CPT | Performed by: NURSE PRACTITIONER

## 2025-03-18 PROCEDURE — G2211 COMPLEX E/M VISIT ADD ON: HCPCS | Performed by: NURSE PRACTITIONER

## 2025-03-18 PROCEDURE — 1126F AMNT PAIN NOTED NONE PRSNT: CPT | Performed by: NURSE PRACTITIONER

## 2025-03-18 PROCEDURE — 1159F MED LIST DOCD IN RCRD: CPT | Performed by: NURSE PRACTITIONER

## 2025-03-18 RX ORDER — ESZOPICLONE 2 MG/1
2 TABLET, FILM COATED ORAL NIGHTLY
Qty: 30 TABLET | Refills: 1 | Status: SHIPPED | OUTPATIENT
Start: 2025-03-18 | End: 2025-03-24

## 2025-03-18 NOTE — PROGRESS NOTES
Chief Complaint   Patient presents with    Transitional Care Management     Facial and arm numbness        Subjective   Maite Lopes is a 69 y.o. female    History of Present Illness  3/14/2025- ER Visit- Patient is a 69-year-old female presenting to the emergency department approximately an hour and a half of some left sided facial numbness and left arm numbness. Patient does report the numbness on the face tends to transition on either side of the mouth. No other acute deficits. Patient does have a history of fibromyalgia. No history of strokes. No history of atrial fibrillation. Patient is not on any anticoagulation. No other acute deficits reported.     3/18/2025- Patient in today to follow-up on ER visit as above.  While in the emergency department she did have a CT of the head with and without as well as a CT angiogram of the neck and head.  The major arterial vasculature within the head and neck was widely patent with no hemodynamically significant stenosis dissection thrombus or aneurysm.  They did note a 2 x 1 x 4 cm right pulmonary nodule.  The recommend further evaluation with CT.  While in the emergency department urinalysis showed a pH of 8.5 the rest was normal, her ionized calcium was 1.19 and the rest was normal.  Her AST was a little high at 37 but her ALT was normal at 24. She does have some numbness at times, they think related to anxiety.  with dementia. PT is his primary care giver.  watching a lot of violent shows with cursing. Bothers her.  is not very mobile. Her son is going to missions in [facility]. Her support is leaving. 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 She does have appointments for counseling. She did take Lunesta 1 mg for sleep, did not really work.     Allergies   Allergen Reactions    Aspirin Hives    Ceftin [Cefuroxime Axetil] Hives    Doxycycline Hives    Egg-Derived Products Hives    Levaquin [Levofloxacin] Hives    Milk-Related  Compounds Other (See Comments)     Allergic     Mucinex [Guaifenesin Er] Hives     From red dye    Red Dye #40 (Allura Red) Hives     Past Medical History:   Diagnosis Date    Allergic     Since childhood    Arthritis     Breast injury     hit in chest wall with baseball    Colon polyp     Diverticulitis     Fatigue     Fibromyalgia, primary     History of transfusion 1976    after child birth     Kidney infection 02/2019    TREATED WITH ABX    Low back pain 2015    Lumbar herniated disc     Osteoarthritis     Osteopenia 2024    Pneumonia 1989    Poor sleep pattern     Trigger finger     Tuberculosis 1961    MENINGITIS, HOSPITALIZED FOR 1 MONTH, FOLLOWED UP WITH HEALTH DEPARTMENT FOR MED TREATMENT    Vitamin D deficiency     Wears glasses       Past Surgical History:   Procedure Laterality Date    BUNIONECTOMY  07/2011    COLONOSCOPY  07/2015    SEVERAL    CYSTOSCOPY N/A 04/03/2019    Procedure: CYSTOSCOPY;  Surgeon: Nunu Hilliard MD;  Location:  [facility];  Service: Obstetrics/Gynecology    ENDOMETRIAL ABLATION  1981    HAMMER TOE REPAIR  07/2011    HAND SURGERY Right     HYSTEROSCOPY LAPAROSCOPY      for endometriosis x2     LAPAROSCOPIC ASSISTED VAGINAL HYSTERECTOMY SALPINGO OOPHORECTOMY N/A 04/03/2019    Procedure: LAPAROSCOPIC ASSISTED VAGINAL HYSTERECTOMY WITH BILATERAL SALPINGO-OOPHORECTOMY, VAGINAL VAULT SUSPENSION, ANTERIOR VAGINAL REPAIR;  Surgeon: Nunu Hilliard MD;  Location:  [facility];  Service: Obstetrics/Gynecology    OOPHORECTOMY      OTHER SURGICAL HISTORY      TWO LAPAROSCOPIC SURGERIES FOR ENDOMETRIOSIS IN 1981 & 1985    SINUS SURGERY  04/1993    SINUS SURGERY  12/1993    SINUS SURGERY  08/2013     Social History     Socioeconomic History    Marital status:    Tobacco Use    Smoking status: Never     Passive exposure: Past    Smokeless tobacco: Never    Tobacco comments:     exposed to 2nd hand smoke as a child    Vaping Use    Vaping status: Never Used   Substance and  Sexual Activity    Alcohol use: No    Drug use: No    Sexual activity: Not Currently     Partners: Male     Birth control/protection: Condom, Birth control pill, Hysterectomy        Current Outpatient Medications:     Ascorbic Acid (VITAMIN C PO), Take 1 tablet by mouth Daily., Disp: , Rfl:     ascorbic acid (VITAMIN C) 500 MG capsule controlled-release CR capsule, Take 1 capsule by mouth Daily., Disp: , Rfl:     B Complex Vitamins (VITAMIN B COMPLEX PO), Take 1 tablet by mouth Daily., Disp: , Rfl:     BUDESONIDE PO, EMPTY CONTENTS OF ONE CAPSULE INTO NETIFLO, ADD DISTILLED WATER, AND IRRIGATE 1-2 TIMES A DAY, Disp: , Rfl:     Coenzyme Q10 (COQ10 PO), Take 1 tablet by mouth Daily., Disp: , Rfl:     CRANBERRY PO, Take 400 mg by mouth Daily. 2 CAP DAILY, Disp: , Rfl:     denosumab (Prolia) 60 MG/ML solution prefilled syringe syringe, 1 ml every 6 months, Disp: , Rfl:     estradiol (ESTRACE) 0.1 MG/GM vaginal cream, use one gram in the vagina 3 x weekly, Disp: , Rfl:     eszopiclone (LUNESTA) 2 MG tablet, Take 1 tablet by mouth Every Night. Take immediately before bedtime, Disp: 30 tablet, Rfl: 1    GARLIC PO, Take 1 tablet by mouth Daily., Disp: , Rfl:     hepatitis A (Havrix) 1440 EL U/ML vaccine, , Disp: , Rfl:     MegaRed Omega-3 Krill Oil 500 MG capsule, Take  by mouth Daily. As directed, Disp: , Rfl:     melatonin 3 MG tablet, 1 po qhs, Disp: , Rfl:     Probiotic Product (Align) 4 MG capsule, Take 1 capsule by mouth Daily., Disp: , Rfl:     VITAMIN E 400 UNIT capsule, Take 1 capsule by mouth 2 (Two) Times a Day., Disp: , Rfl:      Review of Systems   Constitutional:  Negative for chills, fatigue, fever and unexpected weight change.   Respiratory:  Negative for cough, chest tightness, shortness of breath and wheezing.    Cardiovascular:  Negative for chest pain, palpitations and leg swelling.   Gastrointestinal:  Negative for constipation, diarrhea, nausea and vomiting.   Genitourinary:  Negative for difficulty  urinating and dysuria.   Skin:  Negative for color change and rash.   Neurological:  Negative for dizziness, syncope, weakness and headaches.   Psychiatric/Behavioral:  Positive for sleep disturbance. The patient is nervous/anxious.        Objective     Vitals:    03/18/25 1504   BP: 102/58   Pulse: 65   Resp: 18   SpO2: 98%         03/18/25  1504   Weight: 48.5 kg (107 lb)     Body mass index is 19.57 kg/m².  Results for orders placed or performed during the hospital encounter of 03/14/25   Protime-INR    Collection Time: 03/14/25  1:12 PM    Specimen: Blood   Result Value Ref Range    Protime 13.0 12.2 - 14.5 Seconds    INR 0.97 0.89 - 1.12   aPTT    Collection Time: 03/14/25  1:12 PM    Specimen: Blood   Result Value Ref Range    PTT 30.2 22.0 - 39.0 seconds   AST    Collection Time: 03/14/25  1:12 PM    Specimen: Blood   Result Value Ref Range    AST (SGOT) 37 (H) 1 - 32 U/L   ALT    Collection Time: 03/14/25  1:12 PM    Specimen: Blood   Result Value Ref Range    ALT (SGPT) 24 1 - 33 U/L   CBC Auto Differential    Collection Time: 03/14/25  1:12 PM    Specimen: Blood   Result Value Ref Range    WBC 10.42 3.40 - 10.80 10*3/mm3    RBC 4.63 3.77 - 5.28 10*6/mm3    Hemoglobin 14.0 12.0 - 15.9 g/dL    Hematocrit 41.8 34.0 - 46.6 %    MCV 90.3 79.0 - 97.0 fL    MCH 30.2 26.6 - 33.0 pg    MCHC 33.5 31.5 - 35.7 g/dL    RDW 12.7 12.3 - 15.4 %    RDW-SD 41.6 37.0 - 54.0 fl    MPV 10.1 6.0 - 12.0 fL    Platelets 239 140 - 450 10*3/mm3    Neutrophil % 55.1 42.7 - 76.0 %    Lymphocyte % 37.4 19.6 - 45.3 %    Monocyte % 5.8 5.0 - 12.0 %    Eosinophil % 0.9 0.3 - 6.2 %    Basophil % 0.6 0.0 - 1.5 %    Immature Grans % 0.2 0.0 - 0.5 %    Neutrophils, Absolute 5.75 1.70 - 7.00 10*3/mm3    Lymphocytes, Absolute 3.90 (H) 0.70 - 3.10 10*3/mm3    Monocytes, Absolute 0.60 0.10 - 0.90 10*3/mm3    Eosinophils, Absolute 0.09 0.00 - 0.40 10*3/mm3    Basophils, Absolute 0.06 0.00 - 0.20 10*3/mm3    Immature Grans, Absolute 0.02 0.00 -  0.05 10*3/mm3    nRBC 0.0 0.0 - 0.2 /100 WBC   Green Top (Gel)    Collection Time: 03/14/25  1:12 PM   Result Value Ref Range    Extra Tube Hold for add-ons.    Lavender Top    Collection Time: 03/14/25  1:12 PM   Result Value Ref Range    Extra Tube hold for add-on    Gold Top - SST    Collection Time: 03/14/25  1:12 PM   Result Value Ref Range    Extra Tube Hold for add-ons.    Gray Top    Collection Time: 03/14/25  1:12 PM   Result Value Ref Range    Extra Tube Hold for add-ons.    Light Blue Top    Collection Time: 03/14/25  1:12 PM   Result Value Ref Range    Extra Tube Hold for add-ons.    POC CHEM 8    Collection Time: 03/14/25  1:15 PM    Specimen: Blood   Result Value Ref Range    Glucose 95 70 - 130 mg/dL    BUN 13 8 - 26 mg/dL    Creatinine 0.70 0.60 - 1.30 mg/dL    Sodium 141 138 - 146 mmol/L    POC Potassium 3.6 3.5 - 4.9 mmol/L    Chloride 104 98 - 109 mmol/L    Total CO2 24 24 - 29 mmol/L    Hemoglobin 14.6 12.0 - 17.0 g/dL    Hematocrit 43 38 - 51 %    Ionized Calcium 1.19 (L) 1.20 - 1.32 mmol/L    eGFR 93.2 >60.0 mL/min/1.73   ECG 12 Lead ED Triage Standing Order; Acute Stroke (Onset <24 hrs)    Collection Time: 03/14/25  1:51 PM   Result Value Ref Range    QT Interval 406 ms    QTC Interval 447 ms   Urinalysis With Microscopic If Indicated (No Culture) - Urine, Clean Catch    Collection Time: 03/14/25  1:53 PM    Specimen: Urine, Clean Catch   Result Value Ref Range    Color, UA Yellow Yellow, Straw    Appearance, UA Clear Clear    pH, UA 8.5 (H) 5.0 - 8.0    Specific Gravity, UA 1.022 1.001 - 1.030    Glucose, UA Negative Negative    Ketones, UA Negative Negative    Bilirubin, UA Negative Negative    Blood, UA Negative Negative    Protein, UA Negative Negative    Leuk Esterase, UA Negative Negative    Nitrite, UA Negative Negative    Urobilinogen, UA 0.2 E.U./dL 0.2 - 1.0 E.U./dL       Physical Exam  Vitals reviewed.   Constitutional:       Appearance: She is well-developed.   HENT:      Head:  Normocephalic and atraumatic.   Cardiovascular:      Rate and Rhythm: Normal rate and regular rhythm.      Heart sounds: Normal heart sounds.   Pulmonary:      Effort: Pulmonary effort is normal.      Breath sounds: Normal breath sounds.   Skin:     General: Skin is warm and dry.   Neurological:      Mental Status: She is alert and oriented to person, place, and time.   Psychiatric:         Behavior: Behavior normal.         Assessment & Plan   Problems Addressed this Visit          Mental Health    Anxiety - Primary       Sleep    Primary insomnia    Relevant Medications    eszopiclone (LUNESTA) 2 MG tablet     Diagnoses         Codes Comments      Anxiety    -  Primary ICD-10-CM: F41.9  ICD-9-CM: 300.00       Primary insomnia     ICD-10-CM: F51.01  ICD-9-CM: 307.42         I do recommend counseling, going to [employer] to develop more support structures. Patient was encouraged to keep me informed of any acute changes, lack of improvement, or any new concerning symptoms.  If patient becomes concerned, or has any worsening symptoms, they are to report either here, urgent treatment, or emergency room. Plan of care discussed with pt. They verbalized understanding and agreement.     We will increase the Lunesta to 2 mg QHS, As part of this patient's treatment plan I am prescribing controlled substances. The patient has been made aware of appropriate use of such medications, including potential risk of somnolence, limited ability to drive and /or work safely, and potential for dependence or overdose. It has also been made clear that these medications are for use by this patient only, without concomitant use of alcohol or other substances unless prescribed.  Patient has completed prescribing agreement detailing terms of continued prescribing of controlled substances, including monitoring DEJA reports, urine drug screening, and pill counts if necessary. The patient is aware that inappropriate use will result in cessation of  prescribing such medications.  DEJA report has been reviewed and scanned into the patient's chart.  History and physical exam exhibit continued safe and appropriate use of controlled substances at this time. This patient appears to have a low risk of dependence at this time.     Return visit in 1 month    Counseling provided on mental health concerns and insomnia.    Kodi Askew, APRN   3/18/2025   15:40 EDT     Please note that portions of this document were completed with a voice recognition program.     At [facility], we believe that sharing information builds trust and better relationships. You are receiving this note because you are receiving care at [facility] or have recently visited. It is possible you will see health information before a provider has talked with you about it. This kind of information can be easy to misunderstand as it is a medical document. It is intended as [id number] communication. It is written in medical language and may contain abbreviations or verbiage that are unfamiliar. It may appear blunt or direct. Medical documents are intended to carry relevant information, facts as evident, and the clinical opinion of the practitioner.  To help you fully understand what it means for your health, we urge you to discuss this note with your provider.

## 2025-03-19 ENCOUNTER — TELEPHONE (OUTPATIENT)
Age: 70
End: 2025-03-19
Payer: MEDICARE

## 2025-03-19 LAB
1OH-MIDAZOLAM UR QL SCN: NOT DETECTED NG/MG CREAT
6MAM UR QL SCN: NEGATIVE NG/ML
7AMINOCLONAZEPAM/CREAT UR: NOT DETECTED NG/MG CREAT
A-OH ALPRAZ/CREAT UR: NOT DETECTED NG/MG CREAT
A-OH-TRIAZOLAM/CREAT UR CFM: NOT DETECTED NG/MG CREAT
ACP UR QL CFM: NOT DETECTED
ALPRAZ/CREAT UR CFM: NOT DETECTED NG/MG CREAT
AMPHETAMINES UR QL SCN: NEGATIVE NG/ML
APAP UR QL SCN: NEGATIVE UG/ML
BARBITURATES UR QL SCN: NEGATIVE NG/ML
BENZODIAZ SCN METH UR: NEGATIVE
BUPRENORPHINE UR QL SCN: NEGATIVE
BUPRENORPHINE/CREAT UR: NOT DETECTED NG/MG CREAT
CANNABINOIDS UR QL SCN: NEGATIVE NG/ML
CARISOPRODOL UR QL: NEGATIVE NG/ML
CLONAZEPAM/CREAT UR CFM: NOT DETECTED NG/MG CREAT
COCAINE+BZE UR QL SCN: NEGATIVE NG/ML
CREAT UR-MCNC: 42 MG/DL
D-METHORPHAN UR-MCNC: NOT DETECTED NG/ML
D-METHORPHAN+LEVORPHANOL UR QL: NOT DETECTED
DESALKYLFLURAZ/CREAT UR: NOT DETECTED NG/MG CREAT
DIAZEPAM/CREAT UR: NOT DETECTED NG/MG CREAT
ETHANOL UR QL SCN: NEGATIVE G/DL
ETHANOL UR QL SCN: NEGATIVE NG/ML
FENTANYL CTO UR SCN-MCNC: NEGATIVE NG/ML
FENTANYL/CREAT UR: NOT DETECTED NG/MG CREAT
FLUNITRAZEPAM UR QL SCN: NOT DETECTED NG/MG CREAT
GABAPENTIN UR-MCNC: NEGATIVE UG/ML
HALLUCINOGENS UR: NEGATIVE
HYPNOTICS UR QL SCN: NEGATIVE
KETAMINE UR QL: NOT DETECTED
LORAZEPAM/CREAT UR: NOT DETECTED NG/MG CREAT
MEPERIDINE UR QL SCN: NEGATIVE NG/ML
METHADONE UR QL SCN: NEGATIVE NG/ML
METHADONE+METAB UR QL SCN: NEGATIVE NG/ML
MIDAZOLAM/CREAT UR CFM: NOT DETECTED NG/MG CREAT
MISCELLANEOUS, UR: NEGATIVE
NORBUPRENORPHINE/CREAT UR: NOT DETECTED NG/MG CREAT
NORDIAZEPAM/CREAT UR: NOT DETECTED NG/MG CREAT
NORFENTANYL/CREAT UR: NOT DETECTED NG/MG CREAT
NORFLUNITRAZEPAM UR-MCNC: NOT DETECTED NG/MG CREAT
NORKETAMINE UR-MCNC: NOT DETECTED UG/ML
OPIATES UR SCN-MCNC: NEGATIVE NG/ML
OXAZEPAM/CREAT UR: NOT DETECTED NG/MG CREAT
OXYCODONE CTO UR SCN-MCNC: NEGATIVE NG/ML
PCP UR QL SCN: NEGATIVE NG/ML
PRESCRIBED MEDICATIONS: NORMAL
PROPOXYPH UR QL SCN: NEGATIVE NG/ML
TAPENTADOL CTO UR SCN-MCNC: NEGATIVE NG/ML
TEMAZEPAM/CREAT UR: NOT DETECTED NG/MG CREAT
TRAMADOL UR QL SCN: NEGATIVE NG/ML
ZALEPLON UR-MCNC: NOT DETECTED NG/ML
ZOLPIDEM PHENYL-4-CARB UR QL SCN: NOT DETECTED
ZOLPIDEM UR QL SCN: NOT DETECTED
ZOPICLONE-N-OXIDE UR-MCNC: NOT DETECTED NG/ML

## 2025-03-19 NOTE — TELEPHONE ENCOUNTER
Pharmacy Name:  [facility]     Pharmacy representative phone number:  [phone] (Work)     What medication are you calling in regards to:     eszopiclone (LUNESTA) 2 MG tablet       What question does the pharmacy have: PATIENT SAID ROSIBEL BURROWS TOLD HER   SHE COULD TAKE 1 MG AND THE 2 MG AT NIGHT TO EQUAL 3 MG       Who is the provider that prescribed the medication: ROSIBEL BURROWS     Additional notes:  CALL TO CLARIFY THIS INFORMATION

## 2025-03-24 ENCOUNTER — TELEPHONE (OUTPATIENT)
Age: 70
End: 2025-03-24
Payer: MEDICARE

## 2025-03-24 DIAGNOSIS — F51.01 PRIMARY INSOMNIA: Primary | ICD-10-CM

## 2025-03-24 RX ORDER — ZOLPIDEM TARTRATE 10 MG/1
10 TABLET ORAL NIGHTLY PRN
Qty: 30 TABLET | Refills: 0 | Status: SHIPPED | OUTPATIENT
Start: 2025-03-24

## 2025-03-24 NOTE — TELEPHONE ENCOUNTER
Lets go ahead and increase this to 3 mg at bedtime.  This is the maximum dosage.  They really only recommend no more than 2 mg over 65.

## 2025-03-24 NOTE — TELEPHONE ENCOUNTER
Called patient and informed her.     Patient stated that she still is not sleeping with the 2mg dosage. She has been awake since 2:30 this morning. She is afraid of falling with being so tired

## 2025-03-24 NOTE — TELEPHONE ENCOUNTER
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    SHE IS AFRAID THAT SHE WILL FALL AND GET DOWN AND NOT BE ABLE TO TAKE CARE OF HIM    How long have you had these concerns: A DAY OR 2

## 2025-03-24 NOTE — TELEPHONE ENCOUNTER
MARY WITH [facility] TO CHECK STATUS FOR RX LUNESTA, HAS NOT HEARD BACK FROM ANYONE,    PLEASE ADVISE.

## 2025-03-27 NOTE — TELEPHONE ENCOUNTER
Please let her know I have already sent in Ambien to her pharmacy.  She has Ambien 10 mg every evening ordered.

## 2025-04-21 ENCOUNTER — PATIENT OUTREACH (OUTPATIENT)
Dept: OTHER | Facility: HOSPITAL | Age: 70
End: 2025-04-21
Payer: MEDICARE

## 2025-04-22 ENCOUNTER — LAB (OUTPATIENT)
Age: 70
End: 2025-04-22
Payer: MEDICARE

## 2025-04-22 ENCOUNTER — OFFICE VISIT (OUTPATIENT)
Age: 70
End: 2025-04-22
Payer: MEDICARE

## 2025-04-22 VITALS
DIASTOLIC BLOOD PRESSURE: 66 MMHG | HEIGHT: 62 IN | WEIGHT: 109 LBS | HEART RATE: 70 BPM | SYSTOLIC BLOOD PRESSURE: 108 MMHG | OXYGEN SATURATION: 98 % | BODY MASS INDEX: 20.06 KG/M2

## 2025-04-22 DIAGNOSIS — Z11.59 NEED FOR HEPATITIS C SCREENING TEST: ICD-10-CM

## 2025-04-22 DIAGNOSIS — M54.50 LUMBAR SPINE PAIN: ICD-10-CM

## 2025-04-22 DIAGNOSIS — F51.01 PRIMARY INSOMNIA: ICD-10-CM

## 2025-04-22 DIAGNOSIS — Z12.11 SCREEN FOR COLON CANCER: ICD-10-CM

## 2025-04-22 DIAGNOSIS — Z00.00 ENCOUNTER FOR SUBSEQUENT ANNUAL WELLNESS VISIT (AWV) IN MEDICARE PATIENT: Primary | ICD-10-CM

## 2025-04-22 DIAGNOSIS — E78.5 DYSLIPIDEMIA: ICD-10-CM

## 2025-04-22 LAB — HCV AB SER QL: NORMAL

## 2025-04-22 PROCEDURE — 86803 HEPATITIS C AB TEST: CPT | Performed by: NURSE PRACTITIONER

## 2025-04-22 PROCEDURE — 80061 LIPID PANEL: CPT | Performed by: NURSE PRACTITIONER

## 2025-04-22 PROCEDURE — 36415 COLL VENOUS BLD VENIPUNCTURE: CPT | Performed by: NURSE PRACTITIONER

## 2025-04-22 RX ORDER — TRAZODONE HYDROCHLORIDE 50 MG/1
75 TABLET ORAL NIGHTLY
Qty: 120 TABLET | Refills: 3 | Status: SHIPPED | OUTPATIENT
Start: 2025-04-22

## 2025-04-22 RX ORDER — TRAZODONE HYDROCHLORIDE 150 MG/1
75 TABLET ORAL NIGHTLY
COMMUNITY
End: 2025-04-22

## 2025-04-22 NOTE — PATIENT INSTRUCTIONS
Insomnia  Insomnia is a sleep disorder that makes it difficult to fall asleep or stay asleep. Insomnia can cause fatigue, low energy, difficulty concentrating, mood swings, and poor performance at work or school.  There are three different ways to classify insomnia:  Difficulty falling asleep.  Difficulty staying asleep.  Waking up too early in the morning.  Any type of insomnia can be long-term (chronic) or short-term (acute). Both are common. Short-term insomnia usually lasts for 3 months or less. Chronic insomnia occurs at least three times a week for longer than 3 months.  What are the causes?  Insomnia may be caused by another condition, situation, or substance, such as:  Having certain mental health conditions, such as anxiety and depression.  Using caffeine, alcohol, tobacco, or drugs.  Having gastrointestinal conditions, such as gastroesophageal reflux disease (GERD).  Having certain medical conditions. These include:  Asthma.  Alzheimer's disease.  Stroke.  Chronic pain.  An overactive thyroid gland (hyperthyroidism).  Other sleep disorders, such as restless legs syndrome and sleep apnea.  Menopause.  Sometimes, the cause of insomnia may not be known.  What increases the risk?  Risk factors for insomnia include:  Gender. Females are affected more often than males.  Age. Insomnia is more common as people get older.  Stress and certain medical and mental health conditions.  Lack of exercise.  Having an irregular work schedule. This may include working night shifts and traveling between different time zones.  What are the signs or symptoms?  If you have insomnia, the main symptom is having trouble falling asleep or having trouble staying asleep. This may lead to other symptoms, such as:  Feeling tired or having low energy.  Feeling nervous about going to sleep.  Not feeling rested in the morning.  Having trouble concentrating.  Feeling irritable, anxious, or depressed.  How is this diagnosed?  This condition  may be diagnosed based on:  Your symptoms and medical history. Your health care provider may ask about:  Your sleep habits.  Any medical conditions you have.  Your mental health.  A physical exam.  How is this treated?  Treatment for insomnia depends on the cause. Treatment may focus on treating an underlying condition that is causing the insomnia. Treatment may also include:  Medicines to help you sleep.  Counseling or therapy.  Lifestyle adjustments to help you sleep better.  Follow these instructions at home:  Eating and drinking    Limit or avoid alcohol, caffeinated beverages, and products that contain nicotine and tobacco, especially close to bedtime. These can disrupt your sleep.  Do not eat a large meal or eat spicy foods right before bedtime. This can lead to digestive discomfort that can make it hard for you to sleep.  Sleep habits    Keep a sleep diary to help you and your health care provider figure out what could be causing your insomnia. Write down:  When you sleep.  When you wake up during the night.  How well you sleep and how rested you feel the next day.  Any side effects of medicines you are taking.  What you eat and drink.  Make your bedroom a dark, comfortable place where it is easy to fall asleep.  Put up shades or blackout curtains to block light from outside.  Use a white noise machine to block noise.  Keep the temperature cool.  Limit screen use before bedtime. This includes:  Not watching TV.  Not using your smartphone, tablet, or computer.  Stick to a routine that includes going to bed and waking up at the same times every day and night. This can help you fall asleep faster. Consider making a quiet activity, such as reading, part of your nighttime routine.  Try to avoid taking naps during the day so that you sleep better at night.  Get out of bed if you are still awake after 15 minutes of trying to sleep. Keep the lights down, but try reading or doing a quiet activity. When you feel  sleepy, go back to bed.  General instructions  Take over-the-counter and prescription medicines only as told by your health care provider.  Exercise regularly as told by your health care provider. However, avoid exercising in the hours right before bedtime.  Use relaxation techniques to manage stress. Ask your health care provider to suggest some techniques that may work well for you. These may include:  Breathing exercises.  Routines to release muscle tension.  Visualizing peaceful scenes.  Make sure that you drive carefully. Do not drive if you feel very sleepy.  Keep all follow-up visits. This is important.  Contact a health care provider if:  You are tired throughout the day.  You have trouble in your daily routine due to sleepiness.  You continue to have sleep problems, or your sleep problems get worse.  Get help right away if:  You have thoughts about hurting yourself or someone else.  Get help right away if you feel like you may hurt yourself or others, or have thoughts about taking your own life. Go to your nearest emergency room or:  Call 911.  Call the National Suicide Prevention Lifeline at 1-326.949.4049 or 506. This is open 24 hours a day.  Text the Crisis Text Line at 341556.  Summary  Insomnia is a sleep disorder that makes it difficult to fall asleep or stay asleep.  Insomnia can be long-term (chronic) or short-term (acute).  Treatment for insomnia depends on the cause. Treatment may focus on treating an underlying condition that is causing the insomnia.  Keep a sleep diary to help you and your health care provider figure out what could be causing your insomnia.  This information is not intended to replace advice given to you by your health care provider. Make sure you discuss any questions you have with your health care provider.  Document Revised: 11/28/2022 Document Reviewed: 11/28/2022  Elsevier Patient Education © 2024 [employer]

## 2025-04-22 NOTE — PROGRESS NOTES
Subjective   The ABCs of the Annual Wellness Visit  Medicare Wellness Visit      Maite Lopes is a 69 y.o. patient who presents for a Medicare Wellness Visit. Patient stopped Ambien and similar meds. Is using Trazodone and Raw Honey,   She did have labs collected 3/14/2025 at that time her ionized calcium was 1.19, ALT was normal, AST was a little elevated at 37, PTT was normal as well as the INR.  On the CBC she had 3.9 lymphs otherwise having was normal.  On January 22 the CMP showed an elevated AST of 35 the rest was normal and her vitamin D was normal at 59.5.    The following portions of the patient's history were reviewed and   updated as appropriate: allergies, current medications, past family history, past medical history, past social history, past surgical history, and problem list.    Compared to one year ago, the patient's physical   health is the same.  Compared to one year ago, the patient's mental   health is the same.    Recent Hospitalizations:  She was not admitted to the hospital during the last year.     Current Medical Providers:  Patient Care Team:  Kodi Askew APRN as PCP - General (Family Medicine)  Kayleigh Salguero APRN as Nurse Practitioner (Rheumatology)    Outpatient Medications Prior to Visit   Medication Sig Dispense Refill    Ascorbic Acid (VITAMIN C PO) Take 1 tablet by mouth Daily.      B Complex Vitamins (VITAMIN B COMPLEX PO) Take 1 tablet by mouth Daily.      BUDESONIDE PO EMPTY CONTENTS OF ONE CAPSULE INTO NETIFLO, ADD DISTILLED WATER, AND IRRIGATE 1-2 TIMES A DAY      Coenzyme Q10 (COQ10 PO) Take 1 tablet by mouth Daily.      CRANBERRY PO Take 400 mg by mouth Daily. 2 CAP DAILY      denosumab (Prolia) 60 MG/ML solution prefilled syringe syringe 1 ml every 6 months      estradiol (ESTRACE) 0.1 MG/GM vaginal cream use one gram in the vagina 3 x weekly      GARLIC PO Take 1 tablet by mouth Daily.      MegaRed Omega-3 Krill Oil 500 MG capsule Take  by mouth Daily.  "As directed      melatonin 3 MG tablet 1 po qhs      Probiotic Product (Align) 4 MG capsule Take 1 capsule by mouth Daily.      VITAMIN E 400 UNIT capsule Take 1 capsule by mouth 2 (Two) Times a Day.      traZODone (DESYREL) 75 MG half tablet Take 1 half tablet by mouth Every Night.      ascorbic acid (VITAMIN C) 500 MG capsule controlled-release CR capsule Take 1 capsule by mouth Daily.      hepatitis A (Havrix) 1440 EL U/ML vaccine       zolpidem (AMBIEN) 10 MG tablet Take 1 tablet by mouth At Night As Needed for Sleep. (Patient not taking: Reported on 4/22/2025) 30 tablet 0     No facility-administered medications prior to visit.     No opioid medication identified on active medication list. I have reviewed chart for other potential  high risk medication/s and harmful drug interactions in the elderly.      Aspirin is not on active medication list.  Aspirin use is not indicated based on review of current medical condition/s. Risk of harm outweighs potential benefits.  .    Patient Active Problem List   Diagnosis    Fibromyalgia    Primary osteoarthritis involving multiple joints    Osteopenia of multiple sites    Vitamin D deficiency    Other fatigue    Cervical spine pain    Lumbar spine pain    Age-related osteoporosis without current pathological fracture    Poor sleep pattern    Lateral epicondylitis    Hypertrophy of nasal turbinates    Primary insomnia    Anxiety     Advance Care Planning Advance Directive is not on file.  ACP discussion was held with the patient during this visit. Patient has an advance directive (not in EMR), copy requested.            Objective   Vitals:    04/22/25 1319   BP: 108/66   Pulse: 70   SpO2: 98%   Weight: 49.4 kg (109 lb)   Height: 157.5 cm (62\")   PainSc: 0-No pain       Estimated body mass index is 19.94 kg/m² as calculated from the following:    Height as of this encounter: 157.5 cm (62\").    Weight as of this encounter: 49.4 kg (109 lb).    BMI is within normal parameters. " No other follow-up for BMI required.           Does the patient have evidence of cognitive impairment? No                                                                                               Health  Risk Assessment    Smoking Status:  Social History     Tobacco Use   Smoking Status Never    Passive exposure: Past   Smokeless Tobacco Never   Tobacco Comments    exposed to 2nd hand smoke as a child      Alcohol Consumption:  Social History     Substance and Sexual Activity   Alcohol Use No       Fall Risk Screen  STEADI Fall Risk Assessment was completed, and patient is at LOW risk for falls.Assessment completed on:2025    Depression Screening   Little interest or pleasure in doing things? Not at all   Feeling down, depressed, or hopeless? Not at all   PHQ-2 Total Score 0      Health Habits and Functional and Cognitive Screenin/22/2025     1:15 PM   Functional & Cognitive Status   Do you have difficulty preparing food and eating? No   Do you have difficulty bathing yourself, getting dressed or grooming yourself? No   Do you have difficulty using the toilet? No   Do you have difficulty moving around from place to place? No   Do you have trouble with steps or getting out of a bed or a chair? No   Current Diet Well Balanced Diet   Dental Exam Up to date   Eye Exam Up to date   Exercise (times per week) 0 times per week   Current Exercises Include No Regular Exercise;House Cleaning   Do you need help using the phone?  No   Are you deaf or do you have serious difficulty hearing?  No   Do you need help to go to places out of walking distance? No   Do you need help shopping? No   Do you need help preparing meals?  No   Do you need help with housework?  No   Do you need help with laundry? No   Do you need help taking your medications? No   Do you need help managing money? No   Do you ever drive or ride in a car without wearing a seat belt? No   Have you felt unusual stress, anger or loneliness in the  last month? No   Who do you live with? Spouse   If you need help, do you have trouble finding someone available to you? No   Have you been bothered in the last four weeks by sexual problems? No   Do you have difficulty concentrating, remembering or making decisions? No           Age-appropriate Screening Schedule:  Refer to the list below for future screening recommendations based on patient's age, sex and/or medical conditions. Orders for these recommended tests are listed in the plan section. The patient has been provided with a written plan.    Health Maintenance List  Health Maintenance   Topic Date Due    HEPATITIS C SCREENING  Never done    COLORECTAL CANCER SCREENING  07/06/2025    TDAP/TD VACCINES (1 - Tdap) 05/06/2025 (Originally 7/4/1974)    COVID-19 Vaccine (6 - 2024-25 season) 10/22/2025 (Originally 9/1/2024)    INFLUENZA VACCINE  07/01/2025    ANNUAL WELLNESS VISIT  04/22/2026    DXA SCAN  06/30/2026    MAMMOGRAM  07/05/2026    Pneumococcal Vaccine 50+  Completed    ZOSTER VACCINE  Completed                                                                                                                                                CMS Preventative Services Quick Reference  Risk Factors Identified During Encounter  Immunizations Discussed/Encouraged: Tdap to get at Pharmacy    The above risks/problems have been discussed with the patient.  Pertinent information has been shared with the patient in the After Visit Summary.  An After Visit Summary and PPPS were made available to the patient.    Follow Up:   Next Medicare Wellness visit to be scheduled in 1 year.     Assessment & Plan  Encounter for subsequent annual wellness visit (AWV) in Medicare patient  The preventative exam has been reviewed in detail.  Counseling/Anticipatory Guidance discussion included discussing nutrition needs, physical activity, healthy weight, injury prevention, misuse of tobacco, alcohol and drugs, sexual behavior, dental  health, mental health, immunizations, and needed screenings has been discussed. The patient has been assisted with scheduling healthcare procedures for the coming year and given a written document outlining these recommendations. Age-appropriate screening measures have been ordered for the patient today as indicated above.         Dyslipidemia  Will obtain routine testing today and make further recommendations pending results. All results are automatically released to [employer] immediately when they return whether they have been reviewed or not. The patient will be notified about the results, regardless of the findings. If they have not been contacted by the office within 2 weeks after the test has been performed, I want them to contact us to learn about the results.  Orders:    Lipid Panel; Future    Need for hepatitis C screening test    Orders:    Hepatitis C Antibody; Future    Primary insomnia  To assist with insomnia we will have her do sleep hygiene practices.  Orders:    traZODone (DESYREL) 50 MG tablet; Take 1.5 tablets by mouth Every Night.    Screen for colon cancer    Orders:    Ambulatory Referral For Screening Colonoscopy    Lumbar spine pain            RV- 1 yr    Follow Up:   Return in about 1 year (around 4/22/2026), or if symptoms worsen or fail to improve, for Annual, Medicare Wellness.

## 2025-04-22 NOTE — ASSESSMENT & PLAN NOTE
To assist with insomnia we will have her do sleep hygiene practices.  Orders:    traZODone (DESYREL) 50 MG tablet; Take 1.5 tablets by mouth Every Night.

## 2025-04-23 LAB
CHOLEST SERPL-MCNC: 160 MG/DL (ref 0–200)
HDLC SERPL-MCNC: 58 MG/DL (ref 40–60)
LDLC SERPL CALC-MCNC: 75 MG/DL (ref 0–100)
LDLC/HDLC SERPL: 1.22 {RATIO}
TRIGL SERPL-MCNC: 157 MG/DL (ref 0–150)
VLDLC SERPL-MCNC: 27 MG/DL (ref 5–40)

## 2025-06-26 RX ORDER — SODIUM, POTASSIUM,MAG SULFATES 17.5-3.13G
SOLUTION, RECONSTITUTED, ORAL ORAL
Qty: 354 ML | Refills: 0 | Status: SHIPPED | OUTPATIENT
Start: 2025-06-26

## 2025-06-27 ENCOUNTER — TELEPHONE (OUTPATIENT)
Dept: GASTROENTEROLOGY | Facility: CLINIC | Age: 70
End: 2025-06-27

## 2025-06-27 NOTE — TELEPHONE ENCOUNTER
Caller: Maite Lopes    Relationship to Patient: Self    Reason for Call: PATIENT IS NO LONGER WANTING TO RESCHEDULE

## 2025-06-27 NOTE — TELEPHONE ENCOUNTER
Caller: Maite Lopes    Relationship to patient: Self    Best call back number: [phone]        Type of visit: SCOPE    Requested date: BEFORE THE ORIGINAL DATE IF AVAILABLE    If rescheduling, when is the original appointment: 07.11.25     Additional notes:PT DOES NOT HAVE TRANSPORTATION ON ORIG DATE. PLEASE CONTACT PT TO ADVISE OF DIF DATE THAT INSURANCE MIGHT COVER.

## 2025-07-11 ENCOUNTER — OUTSIDE FACILITY SERVICE (OUTPATIENT)
Dept: GASTROENTEROLOGY | Facility: CLINIC | Age: 70
End: 2025-07-11
Payer: MEDICARE

## 2025-07-11 DIAGNOSIS — R19.7 DIARRHEA, UNSPECIFIED TYPE: Primary | ICD-10-CM

## 2025-07-11 PROCEDURE — 88305 TISSUE EXAM BY PATHOLOGIST: CPT | Performed by: INTERNAL MEDICINE

## 2025-07-14 ENCOUNTER — LAB REQUISITION (OUTPATIENT)
Dept: LAB | Facility: HOSPITAL | Age: 70
End: 2025-07-14
Payer: MEDICARE

## 2025-07-14 ENCOUNTER — LAB (OUTPATIENT)
Dept: LAB | Facility: HOSPITAL | Age: 70
End: 2025-07-14
Payer: MEDICARE

## 2025-07-14 DIAGNOSIS — Z86.0100 PERSONAL HISTORY OF COLON POLYPS, UNSPECIFIED: ICD-10-CM

## 2025-07-14 DIAGNOSIS — Z12.11 ENCOUNTER FOR SCREENING FOR MALIGNANT NEOPLASM OF COLON: ICD-10-CM

## 2025-07-14 PROCEDURE — 86258 DGP ANTIBODY EACH IG CLASS: CPT | Performed by: INTERNAL MEDICINE

## 2025-07-14 PROCEDURE — 82784 ASSAY IGA/IGD/IGG/IGM EACH: CPT | Performed by: INTERNAL MEDICINE

## 2025-07-14 PROCEDURE — 86231 EMA EACH IG CLASS: CPT | Performed by: INTERNAL MEDICINE

## 2025-07-14 PROCEDURE — 86364 TISS TRNSGLTMNASE EA IG CLAS: CPT | Performed by: INTERNAL MEDICINE

## 2025-07-15 LAB
CYTO UR: NORMAL
ENDOMYSIUM IGA SER QL: NEGATIVE
GLIADIN PEPTIDE IGA SER-ACNC: 7 UNITS (ref 0–19)
GLIADIN PEPTIDE IGG SER-ACNC: 3 UNITS (ref 0–19)
IGA SERPL-MCNC: 396 MG/DL (ref 64–422)
LAB AP CASE REPORT: NORMAL
LAB AP CLINICAL INFORMATION: NORMAL
PATH REPORT.ADDENDUM SPEC: NORMAL
PATH REPORT.FINAL DX SPEC: NORMAL
PATH REPORT.GROSS SPEC: NORMAL
TTG IGA SER-ACNC: <2 U/ML (ref 0–3)
TTG IGG SER-ACNC: <2 U/ML (ref 0–5)

## 2025-07-21 ENCOUNTER — OFFICE VISIT (OUTPATIENT)
Age: 70
End: 2025-07-21
Payer: MEDICARE

## 2025-07-21 ENCOUNTER — TELEPHONE (OUTPATIENT)
Dept: GASTROENTEROLOGY | Facility: CLINIC | Age: 70
End: 2025-07-21
Payer: MEDICARE

## 2025-07-21 ENCOUNTER — LAB (OUTPATIENT)
Age: 70
End: 2025-07-21
Payer: MEDICARE

## 2025-07-21 VITALS
BODY MASS INDEX: 18.96 KG/M2 | OXYGEN SATURATION: 98 % | WEIGHT: 107 LBS | HEART RATE: 66 BPM | HEIGHT: 63 IN | DIASTOLIC BLOOD PRESSURE: 66 MMHG | SYSTOLIC BLOOD PRESSURE: 122 MMHG

## 2025-07-21 DIAGNOSIS — R91.1 PULMONARY NODULE, RIGHT: ICD-10-CM

## 2025-07-21 DIAGNOSIS — E83.40 MAGNESIUM DISORDER: ICD-10-CM

## 2025-07-21 DIAGNOSIS — M79.7 FIBROMYALGIA: ICD-10-CM

## 2025-07-21 DIAGNOSIS — M81.0 AGE-RELATED OSTEOPOROSIS WITHOUT CURRENT PATHOLOGICAL FRACTURE: ICD-10-CM

## 2025-07-21 DIAGNOSIS — F41.8 ANXIETY WITH DEPRESSION: Primary | ICD-10-CM

## 2025-07-21 LAB — MAGNESIUM SERPL-MCNC: 2.3 MG/DL (ref 1.6–2.4)

## 2025-07-21 PROCEDURE — 1126F AMNT PAIN NOTED NONE PRSNT: CPT | Performed by: NURSE PRACTITIONER

## 2025-07-21 PROCEDURE — G2211 COMPLEX E/M VISIT ADD ON: HCPCS | Performed by: NURSE PRACTITIONER

## 2025-07-21 PROCEDURE — 99214 OFFICE O/P EST MOD 30 MIN: CPT | Performed by: NURSE PRACTITIONER

## 2025-07-21 PROCEDURE — 36415 COLL VENOUS BLD VENIPUNCTURE: CPT | Performed by: NURSE PRACTITIONER

## 2025-07-21 PROCEDURE — 80053 COMPREHEN METABOLIC PANEL: CPT | Performed by: NURSE PRACTITIONER

## 2025-07-21 PROCEDURE — 1160F RVW MEDS BY RX/DR IN RCRD: CPT | Performed by: NURSE PRACTITIONER

## 2025-07-21 PROCEDURE — 83735 ASSAY OF MAGNESIUM: CPT | Performed by: NURSE PRACTITIONER

## 2025-07-21 PROCEDURE — 86140 C-REACTIVE PROTEIN: CPT | Performed by: NURSE PRACTITIONER

## 2025-07-21 PROCEDURE — 1159F MED LIST DOCD IN RCRD: CPT | Performed by: NURSE PRACTITIONER

## 2025-07-21 RX ORDER — MULTIVITAMIN WITH IRON
1 TABLET ORAL DAILY
COMMUNITY

## 2025-07-21 NOTE — PROGRESS NOTES
Chief Complaint   Patient presents with    Anxiety       Subjective   Maite Lopes is a 70 y.o. female    Anxiety  Visit:  Follow-up  Follow-up visit:     Medication compliance:  %    Side effects:  Fatigue, headaches and constipation/diarrhea    Symptoms: depressed mood, dry mouth, insomnia, irritability, malaise/fatigue and nervous/anxious      Symptoms: no chest pain, no confusion, no dizziness, no excessive worry, no nausea, no obsessions, no palpitations and no shortness of breath      Frequency:  Constantly    Severity:  Moderate    Sleep quality:  Poor    Additional information:  Normal task can seem overwhelming at times.    Patient in for worsening anxiety/depression symptoms, worse stress, son and daughter in law have noted this. She does have osteoporosis, is taking Prolia and seeing Rheumatology. She does have a lot of bowel issues, diarrhea, had a colonoscopy 7/11/2025, She reports a lot of muscle spasms, she did have a CT of the neck on 3/14/2025 which showed a 2.1 0.4 cm right pulmonary nodule.  At that time is recommended she had further evaluation with a CT.    Allergies   Allergen Reactions    Aspirin Hives    Ceftin [Cefuroxime Axetil] Hives    Doxycycline Hives    Egg-Derived Products Hives    Levaquin [Levofloxacin] Hives    Milk-Related Compounds Other (See Comments)     Allergic     Mucinex [Guaifenesin Er] Hives     From red dye    Red Dye #40 (Allura Red) Hives     Past Medical History:   Diagnosis Date    Allergic     Since childhood    Arthritis     Breast injury     hit in chest wall with baseball    Colon polyp     Diverticulitis     Fatigue     Fibromyalgia, primary     Headache 2025    History of transfusion 1976    after child birth     Kidney infection 02/2019    TREATED WITH ABX    Low back pain 2015    Lumbar herniated disc     Osteoarthritis     Osteopenia 2024    Pneumonia 1989    Poor sleep pattern     Trigger finger     Tuberculosis 1961    MENINGITIS, HOSPITALIZED  FOR 1 MONTH, FOLLOWED UP WITH HEALTH DEPARTMENT FOR MED TREATMENT    Vitamin D deficiency     Wears glasses       Past Surgical History:   Procedure Laterality Date    BUNIONECTOMY  07/2011    COLONOSCOPY  07/2015    SEVERAL    CYSTOSCOPY N/A 04/03/2019    Procedure: CYSTOSCOPY;  Surgeon: Nunu Hilliard MD;  Location:  [facility];  Service: Obstetrics/Gynecology    ENDOMETRIAL ABLATION  1981    HAMMER TOE REPAIR  07/2011    HAND SURGERY Right     HYSTEROSCOPY LAPAROSCOPY      for endometriosis x2     LAPAROSCOPIC ASSISTED VAGINAL HYSTERECTOMY SALPINGO OOPHORECTOMY N/A 04/03/2019    Procedure: LAPAROSCOPIC ASSISTED VAGINAL HYSTERECTOMY WITH BILATERAL SALPINGO-OOPHORECTOMY, VAGINAL VAULT SUSPENSION, ANTERIOR VAGINAL REPAIR;  Surgeon: Nunu Hilliard MD;  Location:  [facility];  Service: Obstetrics/Gynecology    OOPHORECTOMY      OTHER SURGICAL HISTORY      TWO LAPAROSCOPIC SURGERIES FOR ENDOMETRIOSIS IN 1981 & 1985    SINUS SURGERY  04/1993    SINUS SURGERY  12/1993    SINUS SURGERY  08/2013     Social History     Socioeconomic History    Marital status:    Tobacco Use    Smoking status: Never     Passive exposure: Past    Smokeless tobacco: Never    Tobacco comments:     exposed to 2nd hand smoke as a child    Vaping Use    Vaping status: Never Used   Substance and Sexual Activity    Alcohol use: No    Drug use: No    Sexual activity: Not Currently     Partners: Male     Birth control/protection: Condom, Birth control pill, Hysterectomy        Current Outpatient Medications:     B Complex Vitamins (VITAMIN B COMPLEX PO), Take 1 tablet by mouth Daily., Disp: , Rfl:     BUDESONIDE PO, EMPTY CONTENTS OF ONE CAPSULE INTO NETIFLO, ADD DISTILLED WATER, AND IRRIGATE 1-2 TIMES A DAY, Disp: , Rfl:     Coenzyme Q10 (COQ10 PO), Take 1 tablet by mouth Daily., Disp: , Rfl:     CRANBERRY PO, Take 400 mg by mouth Daily. 2 CAP DAILY, Disp: , Rfl:     denosumab (Prolia) 60 MG/ML solution prefilled syringe  syringe, 1 ml every 6 months, Disp: , Rfl:     estradiol (ESTRACE) 0.1 MG/GM vaginal cream, use one gram in the vagina 3 x weekly, Disp: , Rfl:     GARLIC PO, Take 1 tablet by mouth Daily., Disp: , Rfl:     Magnesium 250 MG tablet, Take 1 tablet by mouth Daily., Disp: , Rfl:     MegaRed Omega-3 Krill Oil 500 MG capsule, Take  by mouth Daily. As directed, Disp: , Rfl:     melatonin 3 MG tablet, 1 po qhs, Disp: , Rfl:     Probiotic Product (Align) 4 MG capsule, Take 1 capsule by mouth Daily., Disp: , Rfl:     traZODone (DESYREL) 50 MG tablet, Take 1.5 tablets by mouth Every Night., Disp: 120 tablet, Rfl: 3    VITAMIN E 400 UNIT capsule, Take 1 capsule by mouth 2 (Two) Times a Day., Disp: , Rfl:      Review of Systems   Constitutional:  Positive for irritability and malaise/fatigue. Negative for chills, fatigue, fever and unexpected weight change.   Respiratory:  Negative for cough, chest tightness, shortness of breath and wheezing.    Cardiovascular:  Negative for chest pain, palpitations and leg swelling.   Gastrointestinal:  Negative for constipation, diarrhea, nausea and vomiting.   Genitourinary:  Negative for difficulty urinating and dysuria.   Musculoskeletal:  Positive for myalgias.   Skin:  Negative for color change and rash.   Neurological:  Negative for dizziness, syncope, weakness and headaches.   Psychiatric/Behavioral:  Positive for sleep disturbance (depends, not much last night). Negative for confusion. The patient is nervous/anxious and has insomnia.        Objective     Vitals:    07/21/25 1442   BP: 122/66   Pulse: 66   SpO2: 98%         07/21/25  1442   Weight: 48.5 kg (107 lb)     Body mass index is 18.95 kg/m².  Results for orders placed or performed in visit on 07/11/25   Tissue Pathology Exam    Collection Time: 07/11/25 12:30 PM    Specimen: Colon; Tissue   Result Value Ref Range    Addendum       Addendum to add authorizing provider; no other change to the pathology report.       Case Report    "    Surgical Pathology Report                         Case: [id number]                                  Authorizing Provider:  Yvan Nick MD   Collected:           07/11/2025 12:30 PM          Ordering Location:     [facility]   Received:            07/14/2025 07:46 AM                                 LABORATORY                                                                   Pathologist:           Abundio Soriano MD                                                            Specimen:    Colon                                                                                      Clinical Information       Personal history of colon polyps, unspecified  Encounter for screening for malignant neoplasm of colon      Final Diagnosis       COLON, RANDOM BIOPSY:  Colonic mucosa with reactive changes  GJK      Gross Description       1. Colon.  Received in formalin labeled \"random colon biopsy\" are multiple tan soft tissue fragments aggregating 1 x 1 x 0.2 cm submitted entirely in a single cassette. HDM        Microscopic Description       The slides are reviewed and demonstrate histopathologic features supporting the above rendered diagnosis.           Physical Exam  Vitals reviewed.   Constitutional:       Appearance: She is well-developed.   HENT:      Head: Normocephalic and atraumatic.   Cardiovascular:      Rate and Rhythm: Normal rate and regular rhythm.      Heart sounds: Normal heart sounds.   Pulmonary:      Effort: Pulmonary effort is normal.      Breath sounds: Normal breath sounds.   Musculoskeletal:         General: Tenderness present.   Skin:     General: Skin is warm and dry.   Neurological:      Mental Status: She is alert and oriented to person, place, and time.   Psychiatric:         Behavior: Behavior normal.         Assessment & Plan   Problems Addressed this Visit          Musculoskeletal and Injuries    Age-related osteoporosis without current pathological fracture    Fibromyalgia "     Other Visit Diagnoses         Anxiety with depression    -  Primary    Relevant Orders    [facility] - Swab,      Magnesium disorder        Relevant Orders    Magnesium      Pulmonary nodule, right        Relevant Orders    CT Chest Without Contrast          Diagnoses         Codes Comments      Anxiety with depression    -  Primary ICD-10-CM: F41.8  ICD-9-CM: 300.4       Age-related osteoporosis without current pathological fracture     ICD-10-CM: M81.0  ICD-9-CM: 733.01       Magnesium disorder     ICD-10-CM: E83.40  ICD-9-CM: 275.2       Fibromyalgia     ICD-10-CM: M79.7  ICD-9-CM: 729.1       Pulmonary nodule, right     ICD-10-CM: R91.1  ICD-9-CM: 793.11           Patient was encouraged to keep me informed of any acute changes, lack of improvement, or any new concerning symptoms.  If patient becomes concerned, or has any worsening symptoms, they are to report either here, urgent treatment, or emergency room. Plan of care discussed with pt. They verbalized understanding and agreement.     Will obtain routine testing today with [facility] and make further recommendations pending results. All results are automatically released to [employer] immediately when they return whether they have been reviewed or not. The patient will be notified about the results, regardless of the findings. If they have not been contacted by the office within 2 weeks after the test has been performed, I want them to contact us to learn about the results.  She reports she is willing to pay for this.    Return visit in 1-2 months    Counseling provided on anxiety and osteoporosis.    Kodi Haroon Askew, APRN   7/21/2025   16:52 EDT     Please note that portions of this document were completed with a voice recognition program.     At [facility], we believe that sharing information builds trust and better relationships. You are receiving this note because you are receiving care at [facility] or have recently visited. It is possible you  will see health information before a provider has talked with you about it. This kind of information can be easy to misunderstand as it is a medical document. It is intended as [id number] communication. It is written in medical language and may contain abbreviations or verbiage that are unfamiliar. It may appear blunt or direct. Medical documents are intended to carry relevant information, facts as evident, and the clinical opinion of the practitioner.  To help you fully understand what it means for your health, we urge you to discuss this note with your provider.

## 2025-07-21 NOTE — TELEPHONE ENCOUNTER
Provider: DR PERRY     Caller: Maite Lopes    Relationship to Patient: Self    Reason for Call: PATIENT WOULD LIKE TO WHAT THE NEXT STEP AFTER HER BX THAT WAS DONE ON 7/11/25 WITH DR PERRY. PLEASE ADVISE

## 2025-07-22 ENCOUNTER — RESULTS FOLLOW-UP (OUTPATIENT)
Age: 70
End: 2025-07-22

## 2025-07-22 ENCOUNTER — OFFICE VISIT (OUTPATIENT)
Age: 70
End: 2025-07-22
Payer: MEDICARE

## 2025-07-22 VITALS
WEIGHT: 104.7 LBS | HEART RATE: 72 BPM | SYSTOLIC BLOOD PRESSURE: 118 MMHG | TEMPERATURE: 97.5 F | DIASTOLIC BLOOD PRESSURE: 70 MMHG | BODY MASS INDEX: 18.55 KG/M2

## 2025-07-22 DIAGNOSIS — M79.7 FIBROMYALGIA: Primary | Chronic | ICD-10-CM

## 2025-07-22 DIAGNOSIS — R53.83 OTHER FATIGUE: ICD-10-CM

## 2025-07-22 DIAGNOSIS — M54.50 LUMBAR SPINE PAIN: ICD-10-CM

## 2025-07-22 DIAGNOSIS — G47.8 POOR SLEEP PATTERN: ICD-10-CM

## 2025-07-22 DIAGNOSIS — M15.0 PRIMARY OSTEOARTHRITIS INVOLVING MULTIPLE JOINTS: Chronic | ICD-10-CM

## 2025-07-22 DIAGNOSIS — M54.2 CERVICAL SPINE PAIN: ICD-10-CM

## 2025-07-22 DIAGNOSIS — E55.9 VITAMIN D DEFICIENCY: ICD-10-CM

## 2025-07-22 DIAGNOSIS — M81.0 AGE-RELATED OSTEOPOROSIS WITHOUT CURRENT PATHOLOGICAL FRACTURE: Chronic | ICD-10-CM

## 2025-07-22 LAB
ALBUMIN SERPL-MCNC: 4.5 G/DL (ref 3.5–5.2)
ALBUMIN/GLOB SERPL: 1.6 G/DL
ALP SERPL-CCNC: 67 U/L (ref 39–117)
ALT SERPL W P-5'-P-CCNC: 22 U/L (ref 1–33)
ANION GAP SERPL CALCULATED.3IONS-SCNC: 16.8 MMOL/L (ref 5–15)
AST SERPL-CCNC: 37 U/L (ref 1–32)
BILIRUB SERPL-MCNC: 0.3 MG/DL (ref 0–1.2)
BUN SERPL-MCNC: 12 MG/DL (ref 8–23)
BUN/CREAT SERPL: 11.4 (ref 7–25)
CALCIUM SPEC-SCNC: 9.7 MG/DL (ref 8.6–10.5)
CHLORIDE SERPL-SCNC: 100 MMOL/L (ref 98–107)
CO2 SERPL-SCNC: 24.2 MMOL/L (ref 22–29)
CREAT SERPL-MCNC: 1.05 MG/DL (ref 0.57–1)
CRP SERPL-MCNC: <0.3 MG/DL (ref 0–0.5)
EGFRCR SERPLBLD CKD-EPI 2021: 56.9 ML/MIN/1.73
GLOBULIN UR ELPH-MCNC: 2.8 GM/DL
GLUCOSE SERPL-MCNC: 62 MG/DL (ref 65–99)
POTASSIUM SERPL-SCNC: 4 MMOL/L (ref 3.5–5.2)
PROT SERPL-MCNC: 7.3 G/DL (ref 6–8.5)
SODIUM SERPL-SCNC: 141 MMOL/L (ref 136–145)

## 2025-07-22 RX ORDER — BACLOFEN 10 MG/1
10 TABLET ORAL NIGHTLY PRN
Qty: 30 TABLET | Refills: 3 | Status: SHIPPED | OUTPATIENT
Start: 2025-07-22

## 2025-07-22 NOTE — ASSESSMENT & PLAN NOTE
Back pain continues to worsen.  There is radiation into upper legs.  Her back hurt with day-to-day activity and she felt like her back was weak.    She went to physical therapy for 3-4 months.  She continues exercises at home.  She has no relief.  Unable to have MRI do to part of needle in her knee since she was a child.  CT of spine ordered with and without contrast and pain has worsened.    Orders:    Ambulatory Referral to [facility]

## 2025-07-22 NOTE — ASSESSMENT & PLAN NOTE
Takes trazodone and melatonin.  Trazodone refilled per primary care  Patient has discontinued baclofen, recommend discussing with primary care, however nightly baclofen would likely be safe to take in conjunction with trazodone if she is tolerating her trazodone well  Amitriptyline lost efficacy after 30 years..  She can ask PCP about lunesta.  She took ambien for a short period of time but stopped as she was worried about the addiction potential.

## 2025-07-22 NOTE — ASSESSMENT & PLAN NOTE
She stopped vitamin D supplement 2/2023 with vitamin D level of 90.5  59.5 1/22/25    Orders:    Vitamin D,25-Hydroxy; Future

## 2025-07-22 NOTE — TELEPHONE ENCOUNTER
Spoke with patient and let her know that her biopsies were normal.  She is still having issues with diarrhea.  I let her know I would speak to Dr Nick and call her back with further recommendations.

## 2025-07-22 NOTE — ASSESSMENT & PLAN NOTE
moderate. Intolerant of bisphosphonates.  Prolia started 12/18/19  Better on 12/21 scan. She is intolerant of bisphosphonates.  Prolia started 12/18/19. She tolerates it well.      Bone density scan on 6/17/2024 reviewed in epic showed osteoporosis of the right femoral neck at -2.6.  Total right hip -1.6.  Total left hip -1.7 and left femoral neck -2.1.  Lumbar spine -1.0.    Continue Prolia, Vitamin D and calcium supplements  Weight bearing exercises encouraged  Prolia injection at [email or web address] 3/10/25 she is scheduled for Sept 10 at [facility] ordered today to have done prior to injection

## 2025-07-22 NOTE — PROGRESS NOTES
Office Visit       Date: 07/22/2025   Patient Name: Maite Lopes  MRN: [id number]  YOB: 1955    Referring Physician: No ref. provider found     Chief Complaint:   Chief Complaint   Patient presents with    Fibromyalgia       History of Present Illness: Maite Lopes is a 70 y.o. female who is here today for ffollow-up of osteoporosis, osteoarthritis and fibromyalgia.      History of Present Illness  She reports feeling worse today, rating her pain as 5 out of 10 in severity, accompanied by 30 minutes of early morning stiffness. She has been diagnosed with a herniated disc and bulging disc in her lower back. She also has osteopenia and suspects the development of osteoporosis in her spine, although she has confirmed osteoporosis in her hips. She experiences pain in the groin area, which she believes is related to her hip condition. Dr. Gilbert Schmitt, who conducted tests on her spine, informed her of significant arthritis in her lower spine. 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 She was advised to manage her condition without these injections.     She has used Voltaren cream for arthritis in her hands and thumbs but must limit its use due to an aspirin allergy. She finds some relief from using a heating pad while sitting. However, she recently experienced difficulty recovering from cleaning her shower stall. She wears copper compression gloves nightly and uses Blue Emu for inflammation.     She reports poor sleep is one of her greater issues.  She has previously tried Flexeril, which caused tremors, and baclofen 10 mg, which she discontinued when she started trazodone 75 mg. She also takes melatonin 3 mg to aid sleep, which lasts about 4 hours, after which the trazodone helps her sleep for a total of up to 7 hours.  She believes she has felt worse since discontinuing her baclofen with more muscle spasms    She  was checked yesterday by Kodi Askew for magnesium levels, which were high normal, leading to a recommendation to discontinue magnesium supplements. She has been experiencing muscle cramps and spasms in her legs for at least 6 months. About 1.5 years ago, she underwent physical therapy and continues to perform the prescribed stretching exercises. If she skips these exercises, she wakes up with cramps.    She is under significant stress due to her 's dementia diagnosis 11.5 years ago. She is also experiencing anxiety and underwent [employer] testing yesterday to determine the most suitable medication, with results expected by Friday. She had a silent migraine in 03/2025, which was attributed to anxiety, and she is currently undergoing talk therapy.    She has noticed dark red spots on her arms, varying in size but not raised. She applies a cream that seems to help. She suspects some spots may be caused by her dog's nails but is unsure about the others. Initially, the spots appeared only on her left arm but have since spread.        Subjective   Review of Systems:  Review of Systems   Constitutional:  Positive for diaphoresis, fatigue and unexpected weight loss.   HENT:  Positive for postnasal drip and sneezing.    Eyes:         Dry eyes   Endocrine: Positive for cold intolerance and polyuria.   Genitourinary:  Positive for frequency and urinary incontinence.   Musculoskeletal:  Positive for arthralgias, back pain, joint swelling and myalgias.   Skin:  Positive for bruise.        Hair loss   Allergic/Immunologic: Positive for environmental allergies and food allergies.   Neurological:  Positive for headache and confusion.   Hematological:  Bruises/bleeds easily.   Psychiatric/Behavioral:  Positive for stress.    .    Past Medical History:   Past Medical History:   Diagnosis Date    Allergic     Since childhood    Arthritis     Breast injury     hit in chest wall with baseball    Colon polyp     Diverticulitis      Fatigue     Fibromyalgia, primary     Headache 2025    History of transfusion 1976    after child birth     Kidney infection 02/2019    TREATED WITH ABX    Low back pain 2015    Lumbar herniated disc     Osteoarthritis     Osteopenia 2024    Pneumonia 1989    Poor sleep pattern     Trigger finger     Tuberculosis 1961    MENINGITIS, HOSPITALIZED FOR 1 MONTH, FOLLOWED UP WITH HEALTH DEPARTMENT FOR MED TREATMENT    Vitamin D deficiency     Wears glasses        Past Surgical History:   Past Surgical History:   Procedure Laterality Date    BUNIONECTOMY  07/2011    COLONOSCOPY  07/2015    SEVERAL    CYSTOSCOPY N/A 04/03/2019    Procedure: CYSTOSCOPY;  Surgeon: Nunu Hilliard MD;  Location:  [facility];  Service: Obstetrics/Gynecology    ENDOMETRIAL ABLATION  1981    HAMMER TOE REPAIR  07/2011    HAND SURGERY Right     HYSTEROSCOPY LAPAROSCOPY      for endometriosis x2     LAPAROSCOPIC ASSISTED VAGINAL HYSTERECTOMY SALPINGO OOPHORECTOMY N/A 04/03/2019    Procedure: LAPAROSCOPIC ASSISTED VAGINAL HYSTERECTOMY WITH BILATERAL SALPINGO-OOPHORECTOMY, VAGINAL VAULT SUSPENSION, ANTERIOR VAGINAL REPAIR;  Surgeon: uNnu Hilliard MD;  Location:  [facility];  Service: Obstetrics/Gynecology    OOPHORECTOMY      OTHER SURGICAL HISTORY      TWO LAPAROSCOPIC SURGERIES FOR ENDOMETRIOSIS IN 1981 & 1985    SINUS SURGERY  04/1993    SINUS SURGERY  12/1993    SINUS SURGERY  08/2013       Family History:   Family History   Problem Relation Age of Onset    Hypertension Sister     Obesity Sister     Breast cancer Maternal Grandmother     Cancer Maternal Grandmother     Heart attack Maternal Grandmother     Hypertension Maternal Grandmother     Hyperlipidemia Maternal Grandmother     Obesity Maternal Grandmother     Arthritis Maternal Grandmother     Heart disease Maternal Grandmother     Arthritis Paternal Grandmother     Cancer Paternal Grandmother     Cancer Maternal Grandfather     Arthritis Maternal Uncle     Heart disease  Maternal Uncle     Ovarian cancer Neg Hx        Social History:   Social History     Socioeconomic History    Marital status:    Tobacco Use    Smoking status: Never     Passive exposure: Past    Smokeless tobacco: Never    Tobacco comments:     exposed to 2nd hand smoke as a child    Vaping Use    Vaping status: Never Used   Substance and Sexual Activity    Alcohol use: No    Drug use: No    Sexual activity: Not Currently     Partners: Male     Birth control/protection: Condom, Birth control pill, Hysterectomy       Medications:   Current Outpatient Medications:     B Complex Vitamins (VITAMIN B COMPLEX PO), Take 1 tablet by mouth Daily., Disp: , Rfl:     BUDESONIDE PO, EMPTY CONTENTS OF ONE CAPSULE INTO NETIFLO, ADD DISTILLED WATER, AND IRRIGATE 1-2 TIMES A DAY, Disp: , Rfl:     Coenzyme Q10 (COQ10 PO), Take 1 tablet by mouth Daily., Disp: , Rfl:     CRANBERRY PO, Take 400 mg by mouth Daily. 2 CAP DAILY, Disp: , Rfl:     denosumab (Prolia) 60 MG/ML solution prefilled syringe syringe, 1 ml every 6 months, Disp: , Rfl:     estradiol (ESTRACE) 0.1 MG/GM vaginal cream, use one gram in the vagina 3 x weekly, Disp: , Rfl:     GARLIC PO, Take 1 tablet by mouth Daily., Disp: , Rfl:     Magnesium 250 MG tablet, Take 1 tablet by mouth Daily., Disp: , Rfl:     MegaRed Omega-3 Krill Oil 500 MG capsule, Take  by mouth Daily. As directed, Disp: , Rfl:     melatonin 3 MG tablet, 1 po qhs, Disp: , Rfl:     Probiotic Product (Align) 4 MG capsule, Take 1 capsule by mouth Daily., Disp: , Rfl:     traZODone (DESYREL) 50 MG tablet, Take 1.5 tablets by mouth Every Night., Disp: 120 tablet, Rfl: 3    VITAMIN E 400 UNIT capsule, Take 1 capsule by mouth 2 (Two) Times a Day., Disp: , Rfl:     baclofen (LIORESAL) 10 MG tablet, Take 1 tablet by mouth At Night As Needed for Muscle Spasms., Disp: 30 tablet, Rfl: 3    Allergies:   Allergies   Allergen Reactions    Aspirin Hives    Ceftin [Cefuroxime Axetil] Hives    Doxycycline Hives     Egg-Derived Products Hives    Levaquin [Levofloxacin] Hives    Milk-Related Compounds Other (See Comments)     Allergic     Mucinex [Guaifenesin Er] Hives     From red dye    Red Dye #40 (Allura Red) Hives       I have reviewed and updated the patient's chief complaint, history of present illness, review of systems, past medical history, surgical history, family history, social history, medications and allergy list as appropriate.     Objective    Vital Signs:   Vitals:    07/22/25 1334   BP: 118/70   BP Location: Left arm   Patient Position: Sitting   Cuff Size: Adult   Pulse: 72   Temp: 97.5 °F (36.4 °C)   Weight: 47.5 kg (104 lb 11.2 oz)   PainSc: 5    PainLoc: Back       Body mass index is 18.55 kg/m².   BMI is within normal parameters. No other follow-up for BMI required.       Physical Exam:  Physical Exam  Vitals reviewed.   Constitutional:       Appearance: Normal appearance.   HENT:      Head: Normocephalic and atraumatic.      Mouth/Throat:      Mouth: Mucous membranes are moist.   Eyes:      Conjunctiva/sclera: Conjunctivae normal.   Cardiovascular:      Rate and Rhythm: Normal rate and regular rhythm.      Pulses: Normal pulses.      Heart sounds: Normal heart sounds.   Pulmonary:      Effort: Pulmonary effort is normal.      Breath sounds: Normal breath sounds.   Musculoskeletal:         General: Normal range of motion.      Cervical back: Normal range of motion and neck supple.      Comments: CMC squaring bilaterally  [place] and stacey bilaterally  No synovitis  Crepitus bilaeral knees   Skin:     General: Skin is warm and dry.      Comments: 2 small erythematous macular areas on left forearm   Neurological:      General: No focal deficit present.      Mental Status: She is alert and oriented to person, place, and time. Mental status is at baseline.   Psychiatric:         Mood and Affect: Mood normal.         Behavior: Behavior normal.         Thought Content: Thought content normal.          Judgment: Judgment normal.          Results Review:   Imaging Results (Last 24 Hours)       ** No results found for the last 24 hours. **            Procedures    Assessment / Plan      Assessment & Plan  Fibromyalgia  Still with fatigue and diffuse pain.   Still with lots of stress associates with  her 's dementia.   Multiple muscle spasms reported since previous visit  Pt global is 5/10 and VAS is 5/10.  She is not sleeping well although her trazodone per her PCP has improved the number of hours she is sleeping    CMP and CRP added onto lab results from yesterday      Orders:    Comprehensive Metabolic Panel; Future    Comprehensive Metabolic Panel    C-reactive Protein     Primary osteoarthritis involving multiple joints  Fingers and feet.  S/P R bunionectomy 7/2011*    Slight enlargement of the R 3rd pip. She has stiffness but minor pain. She has previously declined injection or referral.   Can trial compression gloves  She has more thumb pain and sleeping in spika braces.  Continue follow-up with Dr Dutta, orthopedist.  She has had knee injections.  Voltaren gel helps        Vitamin D deficiency  She stopped vitamin D supplement 2/2023 with vitamin D level of 90.5  59.5 1/22/25    Orders:    Vitamin D,25-Hydroxy; Future     Poor sleep pattern  Takes trazodone and melatonin.  Trazodone refilled per primary care  Patient has discontinued baclofen, recommend discussing with primary care, however nightly baclofen would likely be safe to take in conjunction with trazodone if she is tolerating her trazodone well  Amitriptyline lost efficacy after 30 years..  She can ask PCP about lunesta.  She took ambien for a short period of time but stopped as she was worried about the addiction potential.          Other fatigue  See above        Lumbar spine pain  Back pain continues to worsen.  There is radiation into upper legs.  Her back hurt with day-to-day activity and she felt like her back was weak.    She went to  physical therapy for 3-4 months.  She continues exercises at home.  She has no relief.  Unable to have MRI do to part of needle in her knee since she was a child.  CT of spine ordered with and without contrast and pain has worsened.    Orders:    Ambulatory Referral to [facility]     Cervical spine pain  No longer sees Dr. Shelton.  This is a chronic problem    She has occasional numbness of left hand.    Orders:    Ambulatory Referral to [facility] Management Clinic     Age-related osteoporosis without current pathological fracture  moderate. Intolerant of bisphosphonates.  Prolia started 12/18/19  Better on 12/21 scan. She is intolerant of bisphosphonates.  Prolia started 12/18/19. She tolerates it well.      Bone density scan on 6/17/2024 reviewed in epic showed osteoporosis of the right femoral neck at -2.6.  Total right hip -1.6.  Total left hip -1.7 and left femoral neck -2.1.  Lumbar spine -1.0.    Continue Prolia, Vitamin D and calcium supplements  Weight bearing exercises encouraged  Prolia injection at [facility] 3/10/25 she is scheduled for Sept 10 at [facility] ordered today to have done prior to injection          TIME SPENT: I spent 40 minutes caring for the patient on this date of service.  This time includes time spent by me in the following activities: Preparing for the visit, obtaining records, reviewing/ordering tests and independently reviewing results, performing a medically appropriate history/exam, counseling and educating the patient/family/caregiver, ordering medications, tests, or procedures, and documenting information in the medical record.     Assessment & Plan      Patient or patient representative verbalized consent for the use of Ambient Listening during the visit with  MARCELO Ley for chart documentation. 7/24/2025  14:05 EDT  I attest that the documentation was copied from a previous note but is still current and accurate.      Follow Up:   Return in about 6  months (around 1/22/2026) for Dr. Ortiz.        MARCELO Ley  [facility]

## 2025-07-22 NOTE — ASSESSMENT & PLAN NOTE
No longer sees Dr. Shelton.  This is a chronic problem    She has occasional numbness of left hand.    Orders:    Ambulatory Referral to Pain Management Clinic

## 2025-07-22 NOTE — PROGRESS NOTES
Your magnesium level is currently well in the normal range.  I would recommend not taking any magnesium supplements at this time.

## 2025-07-22 NOTE — ASSESSMENT & PLAN NOTE
Still with fatigue and diffuse pain.   Still with lots of stress associates with  her 's dementia.   Multiple muscle spasms reported since previous visit  Pt global is 5/10 and VAS is 5/10.  She is not sleeping well although her trazodone per her PCP has improved the number of hours she is sleeping    CMP and CRP added onto lab results from yesterday      Orders:    Comprehensive Metabolic Panel; Future    Comprehensive Metabolic Panel    C-reactive Protein

## 2025-07-23 NOTE — TELEPHONE ENCOUNTER
Spoke with patient and she states that she has stopped her magnesium and feels like it has helped some.  She is going to see a provider about starting anxiety medication as she thinks this is part of the problem as well.  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  She will let me know if she continues to have issues.

## 2025-07-25 ENCOUNTER — LAB (OUTPATIENT)
Dept: LAB | Facility: HOSPITAL | Age: 70
End: 2025-07-25
Payer: MEDICARE

## 2025-07-25 DIAGNOSIS — E55.9 VITAMIN D DEFICIENCY: ICD-10-CM

## 2025-07-25 DIAGNOSIS — M79.7 FIBROMYALGIA: Chronic | ICD-10-CM

## 2025-07-25 LAB
ALBUMIN SERPL-MCNC: 4.1 G/DL (ref 3.5–5.2)
ALBUMIN/GLOB SERPL: 1.7 G/DL
ALP SERPL-CCNC: 59 U/L (ref 39–117)
ALT SERPL W P-5'-P-CCNC: 22 U/L (ref 1–33)
ANION GAP SERPL CALCULATED.3IONS-SCNC: 9 MMOL/L (ref 5–15)
AST SERPL-CCNC: 33 U/L (ref 1–32)
BILIRUB SERPL-MCNC: 0.3 MG/DL (ref 0–1.2)
BUN SERPL-MCNC: 9.4 MG/DL (ref 8–23)
BUN/CREAT SERPL: 11.8 (ref 7–25)
CALCIUM SPEC-SCNC: 9.2 MG/DL (ref 8.6–10.5)
CHLORIDE SERPL-SCNC: 102 MMOL/L (ref 98–107)
CO2 SERPL-SCNC: 27 MMOL/L (ref 22–29)
CREAT SERPL-MCNC: 0.8 MG/DL (ref 0.57–1)
EGFRCR SERPLBLD CKD-EPI 2021: 78.9 ML/MIN/1.73
GLOBULIN UR ELPH-MCNC: 2.4 GM/DL
GLUCOSE SERPL-MCNC: 100 MG/DL (ref 65–99)
POTASSIUM SERPL-SCNC: 4 MMOL/L (ref 3.5–5.2)
PROT SERPL-MCNC: 6.5 G/DL (ref 6–8.5)
SODIUM SERPL-SCNC: 138 MMOL/L (ref 136–145)

## 2025-07-25 PROCEDURE — 82306 VITAMIN D 25 HYDROXY: CPT

## 2025-07-25 PROCEDURE — 36415 COLL VENOUS BLD VENIPUNCTURE: CPT

## 2025-07-25 PROCEDURE — 80053 COMPREHEN METABOLIC PANEL: CPT

## 2025-07-26 LAB — 25(OH)D3 SERPL-MCNC: 52.7 NG/ML (ref 30–100)

## 2025-07-28 ENCOUNTER — TELEPHONE (OUTPATIENT)
Age: 70
End: 2025-07-28
Payer: MEDICARE

## 2025-07-28 NOTE — TELEPHONE ENCOUNTER
Caller: Maite Lopes    Relationship: Self    Best call back number: [phone]     What is the best time to reach you: TODAY    Who are you requesting to speak with (clinical staff, provider,  specific staff member): PCP/MA    Do you know the name of the person who called: MAITE    What was the call regarding: PATIENT STATED SHE DONE GENESITE TESTING LAST MONDAY AND IS CALLING TO SEE IF THOSE RESULTS ARE BACK. PLEASE CALL PATIENT     Is it okay if the provider responds through MyChart: CALLBACK

## 2025-07-29 NOTE — TELEPHONE ENCOUNTER
Spoke with patient and advised her the results are still pending and I will call her as soon as we get results. Pt verbalized good understanding.

## 2025-07-30 DIAGNOSIS — F41.8 ANXIETY WITH DEPRESSION: Primary | ICD-10-CM

## 2025-07-30 DIAGNOSIS — F33.1 MODERATE EPISODE OF RECURRENT MAJOR DEPRESSIVE DISORDER: ICD-10-CM

## 2025-08-01 ENCOUNTER — PATIENT MESSAGE (OUTPATIENT)
Age: 70
End: 2025-08-01
Payer: MEDICARE

## 2025-08-01 DIAGNOSIS — F51.01 PRIMARY INSOMNIA: ICD-10-CM

## 2025-08-01 DIAGNOSIS — F41.8 ANXIETY WITH DEPRESSION: Primary | ICD-10-CM

## 2025-08-04 RX ORDER — TRAZODONE HYDROCHLORIDE 50 MG/1
50 TABLET ORAL NIGHTLY
Qty: 90 TABLET | Refills: 3 | Status: SHIPPED | OUTPATIENT
Start: 2025-08-04

## 2025-08-04 RX ORDER — BUSPIRONE HYDROCHLORIDE 5 MG/1
5 TABLET ORAL 2 TIMES DAILY
Qty: 60 TABLET | Refills: 1 | Status: SHIPPED | OUTPATIENT
Start: 2025-08-04

## 2025-08-07 ENCOUNTER — HOSPITAL ENCOUNTER (OUTPATIENT)
Dept: CT IMAGING | Facility: HOSPITAL | Age: 70
Discharge: HOME | End: 2025-08-07
Admitting: NURSE PRACTITIONER
Payer: MEDICARE

## 2025-08-07 DIAGNOSIS — R91.1 PULMONARY NODULE, RIGHT: ICD-10-CM

## 2025-08-07 PROCEDURE — 71250 CT THORAX DX C-: CPT

## 2025-08-30 LAB
25(OH)D3+25(OH)D2 SERPL-MCNC: 49.5 NG/ML (ref 30–100)
ALBUMIN SERPL-MCNC: 4.2 G/DL (ref 3.8–4.8)
ALP SERPL-CCNC: 71 IU/L (ref 44–121)
ALT SERPL-CCNC: 23 IU/L (ref 0–32)
AST SERPL-CCNC: 34 IU/L (ref 0–40)
BILIRUB SERPL-MCNC: 0.4 MG/DL (ref 0–1.2)
BUN SERPL-MCNC: 11 MG/DL (ref 8–27)
BUN/CREAT SERPL: 14 (ref 12–28)
CALCIUM SERPL-MCNC: 9.7 MG/DL (ref 8.7–10.3)
CHLORIDE SERPL-SCNC: 99 MMOL/L (ref 96–106)
CO2 SERPL-SCNC: 25 MMOL/L (ref 20–29)
CREAT SERPL-MCNC: 0.77 MG/DL (ref 0.57–1)
EGFRCR SERPLBLD CKD-EPI 2021: 82 ML/MIN/1.73
GLOBULIN SER CALC-MCNC: 2.2 G/DL (ref 1.5–4.5)
GLUCOSE SERPL-MCNC: 92 MG/DL (ref 70–99)
POTASSIUM SERPL-SCNC: 3.8 MMOL/L (ref 3.5–5.2)
PROT SERPL-MCNC: 6.4 G/DL (ref 6–8.5)
SODIUM SERPL-SCNC: 140 MMOL/L (ref 134–144)

## (undated) DEVICE — ANTIBACTERIAL UNDYED BRAIDED (POLYGLACTIN 910), SYNTHETIC ABSORBABLE SUTURE: Brand: COATED VICRYL

## (undated) DEVICE — NDL HYPO ECLPS SFTY 22G 1 1/2IN

## (undated) DEVICE — GLV SURG SIGNATURE TOUCH PF LTX 7 STRL

## (undated) DEVICE — INTENDED FOR TISSUE SEPARATION, AND OTHER PROCEDURES THAT REQUIRE A SHARP SURGICAL BLADE TO PUNCTURE OR CUT.: Brand: BARD-PARKER ® STAINLESS STEEL BLADES

## (undated) DEVICE — PAD STEEP TRENDELENBURG W/RAIL STRAP INTEGR ARM PROTECT WING

## (undated) DEVICE — SUT VICYL PLS CTD ANTIB BR 1 27IN VIL

## (undated) DEVICE — IRRIGATOR BULB ASEPTO 60CC STRL

## (undated) DEVICE — TUBING, SUCTION, 1/4" X 10', STRAIGHT: Brand: MEDLINE

## (undated) DEVICE — ENDOCUT SCISSOR TIP, DISPOSABLE: Brand: RENEW

## (undated) DEVICE — BLUNT TIP LAPAROSCOPIC SEALER/DIVIDER NANO-COATED: Brand: LIGASURE

## (undated) DEVICE — VCARE SMALL UTERINE MANIPULATOR: Brand: VCARE SMALL

## (undated) DEVICE — DRAPE,TOP,102X53,STERILE: Brand: MEDLINE

## (undated) DEVICE — ENDOPATH XCEL BLADELESS TROCARS WITH STABILITY SLEEVES: Brand: ENDOPATH XCEL

## (undated) DEVICE — SUT MNCRYL PLS ANTIB UD 4/0 PS2 18IN

## (undated) DEVICE — PK LAP HYST 10

## (undated) DEVICE — SPONGE,LAP,4"X18",XR,ST,5/PK,40PK/CS: Brand: MEDLINE INDUSTRIES, INC.

## (undated) DEVICE — ENDOPATH PNEUMONEEDLE INSUFFLATION NEEDLES WITH LUER LOCK CONNECTORS 120MM: Brand: ENDOPATH

## (undated) DEVICE — 3M™ STERI-DRAPE™ INSTRUMENT POUCH 1018: Brand: STERI-DRAPE™

## (undated) DEVICE — MEDI-VAC YANKAUER SUCTION HANDLE W/BULBOUS TIP: Brand: CARDINAL HEALTH

## (undated) DEVICE — SYR TB SFTY 1CC W 27G 1/2IN NDL

## (undated) DEVICE — DECANT BG O JET

## (undated) DEVICE — GLV SURG SENSICARE MICRO PF LF 6.5 STRL

## (undated) DEVICE — SKIN AFFIX SURG ADHESIVE 72/CS 0.55ML: Brand: MEDLINE

## (undated) DEVICE — ELECTRD BLD EXT EDGE 1P COAT 4IN STRL

## (undated) DEVICE — ENDOPATH XCEL UNIVERSAL TROCAR STABLILITY SLEEVES: Brand: ENDOPATH XCEL

## (undated) DEVICE — LEX VAGINAL HYSTERECTOMY: Brand: MEDLINE INDUSTRIES, INC.

## (undated) DEVICE — COVER,LIGHT HANDLE,FLX,1/PK: Brand: MEDLINE INDUSTRIES, INC.

## (undated) DEVICE — FLTR PLUMEPORT LAP W/CONN STRL

## (undated) DEVICE — SUT VIC 0 TIES 18IN J912G